# Patient Record
Sex: FEMALE | Race: BLACK OR AFRICAN AMERICAN | NOT HISPANIC OR LATINO | URBAN - METROPOLITAN AREA
[De-identification: names, ages, dates, MRNs, and addresses within clinical notes are randomized per-mention and may not be internally consistent; named-entity substitution may affect disease eponyms.]

---

## 2017-05-19 ENCOUNTER — OUTPATIENT (OUTPATIENT)
Dept: OUTPATIENT SERVICES | Facility: HOSPITAL | Age: 42
LOS: 1 days | Discharge: HOME | End: 2017-05-19

## 2017-06-28 DIAGNOSIS — Z02.1 ENCOUNTER FOR PRE-EMPLOYMENT EXAMINATION: ICD-10-CM

## 2017-11-13 ENCOUNTER — OUTPATIENT (OUTPATIENT)
Dept: OUTPATIENT SERVICES | Facility: HOSPITAL | Age: 42
LOS: 1 days | Discharge: HOME | End: 2017-11-13

## 2018-01-08 ENCOUNTER — INPATIENT (INPATIENT)
Facility: HOSPITAL | Age: 43
LOS: 0 days | Discharge: HOME | End: 2018-01-09
Attending: SURGERY | Admitting: SURGERY
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
## 2018-01-16 DIAGNOSIS — Z3A.24 24 WEEKS GESTATION OF PREGNANCY: ICD-10-CM

## 2018-01-16 DIAGNOSIS — Z04.1 ENCOUNTER FOR EXAMINATION AND OBSERVATION FOLLOWING TRANSPORT ACCIDENT: ICD-10-CM

## 2018-01-16 DIAGNOSIS — O9A.212 INJURY, POISONING AND CERTAIN OTHER CONSEQUENCES OF EXTERNAL CAUSES COMPLICATING PREGNANCY, SECOND TRIMESTER: ICD-10-CM

## 2018-01-16 DIAGNOSIS — M54.2 CERVICALGIA: ICD-10-CM
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
## 2018-01-16 DIAGNOSIS — R10.11 RIGHT UPPER QUADRANT PAIN: ICD-10-CM

## 2018-03-26 ENCOUNTER — OUTPATIENT (OUTPATIENT)
Dept: OUTPATIENT SERVICES | Facility: HOSPITAL | Age: 43
LOS: 1 days | Discharge: HOME | End: 2018-03-26

## 2018-03-26 DIAGNOSIS — N89.8 OTHER SPECIFIED NONINFLAMMATORY DISORDERS OF VAGINA: ICD-10-CM

## 2018-04-20 ENCOUNTER — INPATIENT (INPATIENT)
Facility: HOSPITAL | Age: 43
LOS: 1 days | Discharge: HOME | End: 2018-04-22
Attending: OBSTETRICS & GYNECOLOGY | Admitting: OBSTETRICS & GYNECOLOGY

## 2018-04-20 VITALS — SYSTOLIC BLOOD PRESSURE: 125 MMHG | DIASTOLIC BLOOD PRESSURE: 75 MMHG | HEART RATE: 104 BPM

## 2018-04-20 DIAGNOSIS — Z98.82 BREAST IMPLANT STATUS: Chronic | ICD-10-CM

## 2018-04-20 LAB
AMPHET UR-MCNC: NEGATIVE — SIGNIFICANT CHANGE UP
APPEARANCE UR: (no result)
BACTERIA # UR AUTO: (no result) /HPF
BARBITURATES UR SCN-MCNC: NEGATIVE — SIGNIFICANT CHANGE UP
BASOPHILS # BLD AUTO: 0.01 K/UL — SIGNIFICANT CHANGE UP (ref 0–0.2)
BASOPHILS NFR BLD AUTO: 0.1 % — SIGNIFICANT CHANGE UP (ref 0–1)
BENZODIAZ UR-MCNC: NEGATIVE — SIGNIFICANT CHANGE UP
BILIRUB UR-MCNC: NEGATIVE — SIGNIFICANT CHANGE UP
BLD GP AB SCN SERPL QL: SIGNIFICANT CHANGE UP
COCAINE METAB.OTHER UR-MCNC: NEGATIVE — SIGNIFICANT CHANGE UP
COLOR SPEC: YELLOW — SIGNIFICANT CHANGE UP
DIFF PNL FLD: NEGATIVE — SIGNIFICANT CHANGE UP
EOSINOPHIL # BLD AUTO: 0.04 K/UL — SIGNIFICANT CHANGE UP (ref 0–0.7)
EOSINOPHIL NFR BLD AUTO: 0.5 % — SIGNIFICANT CHANGE UP (ref 0–8)
EPI CELLS # UR: (no result) /HPF
GLUCOSE UR QL: NEGATIVE MG/DL — SIGNIFICANT CHANGE UP
HCT VFR BLD CALC: 32.7 % — LOW (ref 37–47)
HGB BLD-MCNC: 10.6 G/DL — LOW (ref 12–16)
IMM GRANULOCYTES NFR BLD AUTO: 0.4 % — HIGH (ref 0.1–0.3)
KETONES UR-MCNC: NEGATIVE — SIGNIFICANT CHANGE UP
LEUKOCYTE ESTERASE UR-ACNC: NEGATIVE — SIGNIFICANT CHANGE UP
LYMPHOCYTES # BLD AUTO: 1.27 K/UL — SIGNIFICANT CHANGE UP (ref 1.2–3.4)
LYMPHOCYTES # BLD AUTO: 15.4 % — LOW (ref 20.5–51.1)
MCHC RBC-ENTMCNC: 29.2 PG — SIGNIFICANT CHANGE UP (ref 27–31)
MCHC RBC-ENTMCNC: 32.4 G/DL — SIGNIFICANT CHANGE UP (ref 32–37)
MCV RBC AUTO: 90.1 FL — SIGNIFICANT CHANGE UP (ref 81–99)
METHADONE UR-MCNC: NEGATIVE — SIGNIFICANT CHANGE UP
MONOCYTES # BLD AUTO: 0.79 K/UL — HIGH (ref 0.1–0.6)
MONOCYTES NFR BLD AUTO: 9.6 % — HIGH (ref 1.7–9.3)
NEUTROPHILS # BLD AUTO: 6.12 K/UL — SIGNIFICANT CHANGE UP (ref 1.4–6.5)
NEUTROPHILS NFR BLD AUTO: 74 % — SIGNIFICANT CHANGE UP (ref 42.2–75.2)
NITRITE UR-MCNC: NEGATIVE — SIGNIFICANT CHANGE UP
NRBC # BLD: 0 /100 WBCS — SIGNIFICANT CHANGE UP (ref 0–0)
OPIATES UR-MCNC: NEGATIVE — SIGNIFICANT CHANGE UP
PCP SPEC-MCNC: SIGNIFICANT CHANGE UP
PH UR: 6.5 — SIGNIFICANT CHANGE UP (ref 5–8)
PLATELET # BLD AUTO: 175 K/UL — SIGNIFICANT CHANGE UP (ref 130–400)
PRENATAL SYPHILIS TEST: SIGNIFICANT CHANGE UP
PROPOXYPHENE QUALITATIVE URINE RESULT: NEGATIVE — SIGNIFICANT CHANGE UP
PROT UR-MCNC: (no result) MG/DL
RBC # BLD: 3.63 M/UL — LOW (ref 4.2–5.4)
RBC # FLD: 13.3 % — SIGNIFICANT CHANGE UP (ref 11.5–14.5)
SP GR SPEC: 1.02 — SIGNIFICANT CHANGE UP (ref 1.01–1.03)
TYPE + AB SCN PNL BLD: SIGNIFICANT CHANGE UP
UROBILINOGEN FLD QL: 0.2 MG/DL — SIGNIFICANT CHANGE UP (ref 0.2–0.2)
WBC # BLD: 8.26 K/UL — SIGNIFICANT CHANGE UP (ref 4.8–10.8)
WBC # FLD AUTO: 8.26 K/UL — SIGNIFICANT CHANGE UP (ref 4.8–10.8)

## 2018-04-20 RX ORDER — NALOXONE HYDROCHLORIDE 4 MG/.1ML
0.1 SPRAY NASAL
Qty: 0 | Refills: 0 | Status: DISCONTINUED | OUTPATIENT
Start: 2018-04-20 | End: 2018-04-22

## 2018-04-20 RX ORDER — SODIUM CHLORIDE 9 MG/ML
1000 INJECTION, SOLUTION INTRAVENOUS
Qty: 0 | Refills: 0 | Status: DISCONTINUED | OUTPATIENT
Start: 2018-04-20 | End: 2018-04-21

## 2018-04-20 RX ORDER — OXYTOCIN 10 UNIT/ML
2 VIAL (ML) INJECTION
Qty: 30 | Refills: 0 | Status: DISCONTINUED | OUTPATIENT
Start: 2018-04-20 | End: 2018-04-22

## 2018-04-20 RX ORDER — OXYTOCIN 10 UNIT/ML
41.67 VIAL (ML) INJECTION
Qty: 20 | Refills: 0 | Status: DISCONTINUED | OUTPATIENT
Start: 2018-04-20 | End: 2018-04-21

## 2018-04-20 RX ORDER — ONDANSETRON 8 MG/1
4 TABLET, FILM COATED ORAL EVERY 6 HOURS
Qty: 0 | Refills: 0 | Status: DISCONTINUED | OUTPATIENT
Start: 2018-04-20 | End: 2018-04-22

## 2018-04-20 RX ORDER — SODIUM CHLORIDE 9 MG/ML
1000 INJECTION, SOLUTION INTRAVENOUS ONCE
Qty: 0 | Refills: 0 | Status: DISCONTINUED | OUTPATIENT
Start: 2018-04-20 | End: 2018-04-21

## 2018-04-20 NOTE — PROCEDURE NOTE - GENERAL PROCEDURE DETAILS
[place] aseptic prep with betadine @l4L5 by loss of resistance technique under aseptic condition pt tolerated very well

## 2018-04-20 NOTE — OB PROVIDER H&P - NSHPPHYSICALEXAM_GEN_ALL_CORE
Vital Signs Last 24 Hrs  HR: 104 (20 Apr 2018 08:55) (104 - 104)  BP: 125/75 (20 Apr 2018 08:55) (125/75 - 125/75)  EFM: 130/mod/accels+  [place]: q6-7min   Abd: soft, gravid, nontender, palpable ctx   SVE: 3/50/-2, vtx by sono, intact

## 2018-04-20 NOTE — OB PROVIDER H&P - ASSESSMENT
41yo  at 39w GA, GBS neg, AMA, for IOL with pitocin for favorable cervix at term.   - admit to L&D  - admission labs   - continuous EFM/toco  - clear liquids   - pain mgmt prn     Dr. Powers and Dr. Longo

## 2018-04-20 NOTE — PROGRESS NOTE ADULT - SUBJECTIVE AND OBJECTIVE BOX
PGY III Note    pt seen and examined at bedside, requesting epidural for pain management, reports no additional issues at this time    T(F): --  HR: 84 (15:14)  BP: 115/70 (15:14)  RR: --    EFM: 125/mod/+acc  TOCO: q4   SVE: deferred    Labs:                        10.6   8.26  )-----------( 175      ( 2018 10:10 )             32.7       Urinalysis Basic - ( 2018 10:10 )    Color: Yellow / Appearance: Cloudy / S.020 / pH: x  Gluc: x / Ketone: Negative  / Bili: Negative / Urobili: 0.2 mg/dL   Blood: x / Protein: Trace mg/dL / Nitrite: Negative   Leuk Esterase: Negative / RBC: x / WBC x   Sq Epi: x / Non Sq Epi: Few /HPF / Bacteria: Moderate /HPF    Drug Screen W/PCP, Urine: Done (18 @ 10:10)    Prenatal Syphilis Test: Nonreact (18 @ 10:11)      Meds:  pitocin, now at 10 mu/min  epidural

## 2018-04-20 NOTE — PROGRESS NOTE ADULT - ASSESSMENT
43 yo  at 39w0d, GBS negative, IOL, now off pitocin, s/p SROM, in active labor  - pain management PRN  - continuous toco and efm  - PMD to reassess as needed, anticipate deliver

## 2018-04-20 NOTE — PROGRESS NOTE ADULT - SUBJECTIVE AND OBJECTIVE BOX
called to bedside b/p 80/50 , Pt complaining of nausea.  Pt currently receiving oxygen via mask, on left side.   Phenylephrine 100mcg administered IV   Repeat B/P 101/59

## 2018-04-20 NOTE — PROGRESS NOTE ADULT - SUBJECTIVE AND OBJECTIVE BOX
OBBLAYNEN PGY1 Note:     Patient seen and examined at bedside. Comfortable, denies complaints. Occasional late decels, put in left lateral position, oxygen by face mask and IV fluid bolus. S/P phenylephrine for symptomatic low BPs.      T(C): 36.7 (18 @ 15:16), Max: 37.1 (18 @ 09:08)  HR: 75 (18 @ 17:01) (74 - 107)  BP: 75/43 (18 @ 16:54) (75/43 - 125/75)  RR: 18 (18 @ 09:08) (18 - 18)  EFM: 130/mod/accels+/late decels  [place]: q1-2min  SVE: deferred    Meds: lactated ringers Bolus 1000 milliLiter(s) IV Bolus once  lactated ringers. 1000 milliLiter(s) IV Continuous <Continuous>  naloxone Injectable 0.1 milliGRAM(s) IV Push every 3 minutes PRN  ondansetron Injectable 4 milliGRAM(s) IV Push every 6 hours PRN  oxytocin Infusion 41.667 milliUNIT(s)/Min IV Continuous <Continuous>  oxytocin Infusion 2 milliUNIT(s)/Min IV Continuous <Continuous> started at 0953, currently at 10mu/min   1515- epidural      Labs:             none new         A/P: 41yo  at 39w GA, GBS neg, IOL now on pitocin, S/P epidural   - continuous EFM/toco  - IV fluids   - pain mgmt prn   - c/w pitocin   - monitor vital signs Statement Selected

## 2018-04-20 NOTE — OB PROVIDER H&P - ATTENDING COMMENTS
IMP: IUP @ 39 wks, Favorable Cervix    Plan: Admit, Pitocin Induction, Pain Management, Anticipated

## 2018-04-20 NOTE — OB PROVIDER H&P - NSHPLABSRESULTS_GEN_ALL_CORE
GTT 87/141/105/100   ega- 29.3w- efw 1388g (51%) cephalic, 3vc, anterior placenta, MVP 6.1cm normal anatomy  ega- 18.4w- efw 309g, transverse, 3vc, anterior placenta, normal anatomy  ega- 12.5w- SIUP

## 2018-04-20 NOTE — OB PROVIDER DELIVERY SUMMARY - NSPROVIDERDELIVERYNOTE_OBGYN_ALL_OB_FT
Pt became fully dilated and pushed well over intact perineum, delivery of head in MYRA position, nose and mouth bulb suctioned on perineum, followed by delivery of shoulders and body without difficulty, live male infant, APGARs 9/9, 3545gms, 3-vessel cord + placenta complete, no complications

## 2018-04-20 NOTE — PROGRESS NOTE ADULT - SUBJECTIVE AND OBJECTIVE BOX
NICHOLAS PGY1 Note:     Patient seen and examined at bedside. Comfortable, denies complaints.      T(C): 37.1 (18 @ 09:08), Max: 37.1 (18 @ 09:08)  HR: 104 (18 @ 09:08) (104 - 104)  BP: 125/75 (18 @ 09:08) (125/75 - 125/75)  RR: 18 (18 @ 09:08) (18 - 18)  EFM: 135/mod/accels+  Alexander: q2-3min  SVE: deferred    Meds: lactated ringers Bolus 1000 milliLiter(s) IV Bolus once  lactated ringers. 1000 milliLiter(s) IV Continuous <Continuous>  oxytocin Infusion 41.667 milliUNIT(s)/Min IV Continuous <Continuous>  oxytocin Infusion 2 milliUNIT(s)/Min IV Continuous <Continuous> started at 1008m currently at 6mu/min      Labs:   A pos, RPR- NR                     10.6   8.26  )-----------( 175      ( 2018 10:10 )             32.7         Urinalysis Basic - ( 2018 10:10 )    Color: Yellow / Appearance: Cloudy / S.020 / pH: x  Gluc: x / Ketone: Negative  / Bili: Negative / Urobili: 0.2 mg/dL   Blood: x / Protein: Trace mg/dL / Nitrite: Negative   Leuk Esterase: Negative / RBC: x / WBC x   Sq Epi: x / Non Sq Epi: Few /HPF / Bacteria: Moderate /HPF          A/P: 41yo  at 39w GA, GBS neg, for IOL with pitocin   - continuous EFm/toco  - clear liquids   - pain mgmt prn   - c/w pitocin

## 2018-04-20 NOTE — OB PROVIDER DELIVERY SUMMARY - NSNYCREQUIREMENTS_OBGYN_ALL_OB
ADD [facility] REQUIREMENTS SECTION ([facility]/J/SI)...

## 2018-04-20 NOTE — PROGRESS NOTE ADULT - SUBJECTIVE AND OBJECTIVE BOX
PGY III Note    pt seen and examined at bedside by PMD, desired pain management at this time. No Additional complaints    T(F): --  HR: 65 (23:42)  BP: 91/52 (23:42)  RR: --    EFM: 115/mod/+acc  TOCO: q4-5 minutes  SVE: 9/100/0    Labs:                        10.6   8.26  )-----------( 175      ( 2018 10:10 )             32.7       Urinalysis Basic - ( 2018 10:10 )    Color: Yellow / Appearance: Cloudy / S.020 / pH: x  Gluc: x / Ketone: Negative  / Bili: Negative / Urobili: 0.2 mg/dL   Blood: x / Protein: Trace mg/dL / Nitrite: Negative   Leuk Esterase: Negative / RBC: x / WBC x   Sq Epi: x / Non Sq Epi: Few /HPF / Bacteria: Moderate /HPF      Drug Screen W/PCP, Urine: Done (18 @ 10:10)    Prenatal Syphilis Test: Nonreact (18 @ 10:11)      Meds:  pitocin, now d/c'd  epidural

## 2018-04-20 NOTE — PROGRESS NOTE ADULT - ASSESSMENT
43 yo  at 39w0d, GBS negative, IOL, now on pitocin, s/p epidural  - continue with pitocin induction  - pain management PRN  - continuous toco and efm    Dr. Longo aware

## 2018-04-20 NOTE — OB PROVIDER H&P - HISTORY OF PRESENT ILLNESS
41yo  at 39w GA, by LMP c/w 1st trim sono, for IOL in view of favorable cervix at term. Denies ctx, VB/LOF. Good fetal movements. Elevated GCT, normal GTT. No other complications during this pregnancy. Last exam 3/50/-2.

## 2018-04-21 LAB
BASOPHILS # BLD AUTO: 0.03 K/UL — SIGNIFICANT CHANGE UP (ref 0–0.2)
BASOPHILS NFR BLD AUTO: 0.3 % — SIGNIFICANT CHANGE UP (ref 0–1)
EOSINOPHIL # BLD AUTO: 0.05 K/UL — SIGNIFICANT CHANGE UP (ref 0–0.7)
EOSINOPHIL NFR BLD AUTO: 0.5 % — SIGNIFICANT CHANGE UP (ref 0–8)
HCT VFR BLD CALC: 30.3 % — LOW (ref 37–47)
HGB BLD-MCNC: 10 G/DL — LOW (ref 12–16)
IMM GRANULOCYTES NFR BLD AUTO: 0.5 % — HIGH (ref 0.1–0.3)
LYMPHOCYTES # BLD AUTO: 1.7 K/UL — SIGNIFICANT CHANGE UP (ref 1.2–3.4)
LYMPHOCYTES # BLD AUTO: 16.2 % — LOW (ref 20.5–51.1)
MCHC RBC-ENTMCNC: 29.7 PG — SIGNIFICANT CHANGE UP (ref 27–31)
MCHC RBC-ENTMCNC: 33 G/DL — SIGNIFICANT CHANGE UP (ref 32–37)
MCV RBC AUTO: 89.9 FL — SIGNIFICANT CHANGE UP (ref 81–99)
MONOCYTES # BLD AUTO: 0.88 K/UL — HIGH (ref 0.1–0.6)
MONOCYTES NFR BLD AUTO: 8.4 % — SIGNIFICANT CHANGE UP (ref 1.7–9.3)
NEUTROPHILS # BLD AUTO: 7.78 K/UL — HIGH (ref 1.4–6.5)
NEUTROPHILS NFR BLD AUTO: 74.1 % — SIGNIFICANT CHANGE UP (ref 42.2–75.2)
NRBC # BLD: 0 /100 WBCS — SIGNIFICANT CHANGE UP (ref 0–0)
PLATELET # BLD AUTO: 156 K/UL — SIGNIFICANT CHANGE UP (ref 130–400)
RBC # BLD: 3.37 M/UL — LOW (ref 4.2–5.4)
RBC # FLD: 13.3 % — SIGNIFICANT CHANGE UP (ref 11.5–14.5)
WBC # BLD: 10.49 K/UL — SIGNIFICANT CHANGE UP (ref 4.8–10.8)
WBC # FLD AUTO: 10.49 K/UL — SIGNIFICANT CHANGE UP (ref 4.8–10.8)

## 2018-04-21 RX ORDER — OXYTOCIN 10 UNIT/ML
41.67 VIAL (ML) INJECTION
Qty: 20 | Refills: 0 | Status: DISCONTINUED | OUTPATIENT
Start: 2018-04-21 | End: 2018-04-22

## 2018-04-21 RX ORDER — SIMETHICONE 80 MG/1
80 TABLET, CHEWABLE ORAL EVERY 6 HOURS
Qty: 0 | Refills: 0 | Status: DISCONTINUED | OUTPATIENT
Start: 2018-04-21 | End: 2018-04-22

## 2018-04-21 RX ORDER — MAGNESIUM HYDROXIDE 400 MG/1
30 TABLET, CHEWABLE ORAL
Qty: 0 | Refills: 0 | Status: DISCONTINUED | OUTPATIENT
Start: 2018-04-21 | End: 2018-04-22

## 2018-04-21 RX ORDER — SODIUM CHLORIDE 9 MG/ML
3 INJECTION INTRAMUSCULAR; INTRAVENOUS; SUBCUTANEOUS EVERY 8 HOURS
Qty: 0 | Refills: 0 | Status: DISCONTINUED | OUTPATIENT
Start: 2018-04-21 | End: 2018-04-22

## 2018-04-21 RX ORDER — BENZOCAINE 10 %
1 GEL (GRAM) MUCOUS MEMBRANE EVERY 6 HOURS
Qty: 0 | Refills: 0 | Status: DISCONTINUED | OUTPATIENT
Start: 2018-04-21 | End: 2018-04-22

## 2018-04-21 RX ORDER — AER TRAVELER 0.5 G/1
1 SOLUTION RECTAL; TOPICAL EVERY 4 HOURS
Qty: 0 | Refills: 0 | Status: DISCONTINUED | OUTPATIENT
Start: 2018-04-21 | End: 2018-04-22

## 2018-04-21 RX ORDER — ACETAMINOPHEN 500 MG
650 TABLET ORAL EVERY 6 HOURS
Qty: 0 | Refills: 0 | Status: DISCONTINUED | OUTPATIENT
Start: 2018-04-21 | End: 2018-04-22

## 2018-04-21 RX ORDER — OXYCODONE AND ACETAMINOPHEN 5; 325 MG/1; MG/1
2 TABLET ORAL EVERY 6 HOURS
Qty: 0 | Refills: 0 | Status: DISCONTINUED | OUTPATIENT
Start: 2018-04-21 | End: 2018-04-22

## 2018-04-21 RX ORDER — LANOLIN
1 OINTMENT (GRAM) TOPICAL EVERY 6 HOURS
Qty: 0 | Refills: 0 | Status: DISCONTINUED | OUTPATIENT
Start: 2018-04-21 | End: 2018-04-22

## 2018-04-21 RX ORDER — DOCUSATE SODIUM 100 MG
100 CAPSULE ORAL
Qty: 0 | Refills: 0 | Status: DISCONTINUED | OUTPATIENT
Start: 2018-04-21 | End: 2018-04-22

## 2018-04-21 RX ORDER — DIBUCAINE 1 %
1 OINTMENT (GRAM) RECTAL EVERY 4 HOURS
Qty: 0 | Refills: 0 | Status: DISCONTINUED | OUTPATIENT
Start: 2018-04-21 | End: 2018-04-22

## 2018-04-21 RX ORDER — IBUPROFEN 200 MG
600 TABLET ORAL EVERY 6 HOURS
Qty: 0 | Refills: 0 | Status: DISCONTINUED | OUTPATIENT
Start: 2018-04-21 | End: 2018-04-22

## 2018-04-21 RX ORDER — DIPHENHYDRAMINE HCL 50 MG
25 CAPSULE ORAL EVERY 6 HOURS
Qty: 0 | Refills: 0 | Status: DISCONTINUED | OUTPATIENT
Start: 2018-04-21 | End: 2018-04-22

## 2018-04-21 RX ADMIN — Medication 600 MILLIGRAM(S): at 14:14

## 2018-04-21 RX ADMIN — Medication 600 MILLIGRAM(S): at 13:44

## 2018-04-21 RX ADMIN — SODIUM CHLORIDE 3 MILLILITER(S): 9 INJECTION INTRAMUSCULAR; INTRAVENOUS; SUBCUTANEOUS at 22:11

## 2018-04-21 RX ADMIN — Medication 600 MILLIGRAM(S): at 03:46

## 2018-04-21 RX ADMIN — SODIUM CHLORIDE 3 MILLILITER(S): 9 INJECTION INTRAMUSCULAR; INTRAVENOUS; SUBCUTANEOUS at 06:36

## 2018-04-21 RX ADMIN — SODIUM CHLORIDE 3 MILLILITER(S): 9 INJECTION INTRAMUSCULAR; INTRAVENOUS; SUBCUTANEOUS at 10:31

## 2018-04-21 NOTE — PROGRESS NOTE ADULT - SUBJECTIVE AND OBJECTIVE BOX
Pt seen at bedside, no complaints, nursing    Vital Signs Last 24 Hrs  T(C): 36.2 (21 Apr 2018 07:43), Max: 36.7 (20 Apr 2018 15:16)  T(F): 97.1 (21 Apr 2018 07:43), Max: 98.06 (20 Apr 2018 15:16)  HR: 67 (21 Apr 2018 07:43) (54 - 107)  BP: 106/58 (21 Apr 2018 07:43) (70/36 - 128/71)  RR: 19 (21 Apr 2018 07:43) (18 - 19)    Resp - CTA b/l  CVS - S1S2+, RRR  Breasts - soft, NT  Abd - soft, NTND, BS+, uterus - firm  VE - Moderate lochia, perineum- intact  Ext - No Homans    LABS                          10.6   8.26  )-----------( 175      ( 20 Apr 2018 10:10 )             32.7       A Pos, Rubella - Immune, RPR - NR

## 2018-04-22 ENCOUNTER — TRANSCRIPTION ENCOUNTER (OUTPATIENT)
Age: 43
End: 2018-04-22

## 2018-04-22 VITALS
RESPIRATION RATE: 18 BRPM | DIASTOLIC BLOOD PRESSURE: 55 MMHG | SYSTOLIC BLOOD PRESSURE: 107 MMHG | TEMPERATURE: 96 F | HEART RATE: 81 BPM

## 2018-04-22 RX ORDER — IBUPROFEN 200 MG
1 TABLET ORAL
Qty: 0 | Refills: 0 | DISCHARGE
Start: 2018-04-22

## 2018-04-22 RX ADMIN — Medication 600 MILLIGRAM(S): at 08:18

## 2018-04-22 RX ADMIN — Medication 600 MILLIGRAM(S): at 00:51

## 2018-04-22 RX ADMIN — Medication 600 MILLIGRAM(S): at 01:22

## 2018-04-22 NOTE — PROGRESS NOTE ADULT - SUBJECTIVE AND OBJECTIVE BOX
Pt seen at bedside, no complaints, nursing    Vital Signs Last 24 Hrs  T(C): 35.7 (22 Apr 2018 08:34), Max: 36.3 (21 Apr 2018 23:57)  T(F): 96.3 (22 Apr 2018 08:34), Max: 97.3 (21 Apr 2018 23:57)  HR: 81 (22 Apr 2018 08:34) (81 - 92)  BP: 107/55 (22 Apr 2018 08:34) (98/56 - 107/55)  RR: 18 (22 Apr 2018 08:34) (18 - 20)    Resp - CTA b/l  CVS - S1S2+, RRR  Breasts - soft, NT  Abd - soft, NTND, BS+, uterus - firm  VE - Minimal lochia, perineum- intact  Ext - No Homans    LABS                          10.0   10.49 )-----------( 156      ( 21 Apr 2018 18:35 )             30.3

## 2018-04-22 NOTE — DISCHARGE NOTE OB - CARE PROVIDER_API CALL
Georges Longo), Obstetrics and Gynecology  [address] [place] [address]  Phone: [phone]  Fax: [phone]

## 2018-04-22 NOTE — DISCHARGE NOTE OB - MEDICATION SUMMARY - MEDICATIONS TO TAKE
I will START or STAY ON the medications listed below when I get home from the hospital:    ibuprofen 600 mg oral tablet  -- 1 tab(s) by mouth every 6 hours, As needed, Moderate Pain  -- Indication: For home

## 2018-04-22 NOTE — DISCHARGE NOTE OB - PATIENT PORTAL LINK FT
You can access the [facility] Patient Portal, offered by [facility], by registering with the following website: [email or web address]

## 2018-04-26 DIAGNOSIS — Z3A.39 39 WEEKS GESTATION OF PREGNANCY: ICD-10-CM

## 2018-04-26 DIAGNOSIS — Z33.1 PREGNANT STATE, INCIDENTAL: ICD-10-CM

## 2019-10-03 ENCOUNTER — APPOINTMENT (OUTPATIENT)
Dept: OBGYN | Facility: CLINIC | Age: 44
End: 2019-10-03

## 2019-10-03 PROBLEM — Z00.00 ENCOUNTER FOR PREVENTIVE HEALTH EXAMINATION: Status: ACTIVE | Noted: 2019-10-03

## 2019-10-03 LAB — CYTOLOGY CVX/VAG DOC THIN PREP: NORMAL

## 2020-01-21 ENCOUNTER — APPOINTMENT (OUTPATIENT)
Dept: OBGYN | Facility: CLINIC | Age: 45
End: 2020-01-21
Payer: COMMERCIAL

## 2020-01-21 VITALS
HEIGHT: 69 IN | SYSTOLIC BLOOD PRESSURE: 124 MMHG | DIASTOLIC BLOOD PRESSURE: 78 MMHG | BODY MASS INDEX: 26.96 KG/M2 | WEIGHT: 182 LBS

## 2020-01-21 DIAGNOSIS — R30.0 DYSURIA: ICD-10-CM

## 2020-01-21 DIAGNOSIS — Z87.440 PERSONAL HISTORY OF URINARY (TRACT) INFECTIONS: ICD-10-CM

## 2020-01-21 PROCEDURE — 81002 URINALYSIS NONAUTO W/O SCOPE: CPT

## 2020-01-21 PROCEDURE — 99214 OFFICE O/P EST MOD 30 MIN: CPT | Mod: 25

## 2020-01-21 RX ORDER — CIPROFLOXACIN HYDROCHLORIDE 500 MG/1
500 TABLET, FILM COATED ORAL TWICE DAILY
Qty: 14 | Refills: 0 | Status: COMPLETED | COMMUNITY
Start: 2020-01-21 | End: 2020-01-28

## 2020-01-21 RX ORDER — PHENAZOPYRIDINE HYDROCHLORIDE 200 MG/1
200 TABLET ORAL 3 TIMES DAILY
Qty: 6 | Refills: 0 | Status: COMPLETED | COMMUNITY
Start: 2020-01-21 | End: 2020-01-23

## 2020-03-26 RX ORDER — CIPROFLOXACIN HYDROCHLORIDE 500 MG/1
500 TABLET, FILM COATED ORAL
Qty: 14 | Refills: 0 | Status: ACTIVE | COMMUNITY
Start: 2020-03-26 | End: 1900-01-01

## 2020-06-01 DIAGNOSIS — Z30.9 ENCOUNTER FOR CONTRACEPTIVE MANAGEMENT, UNSPECIFIED: ICD-10-CM

## 2020-06-01 RX ORDER — NORETHINDRONE ACETATE AND ETHINYL ESTRADIOL, ETHINYL ESTRADIOL AND FERROUS FUMARATE 1MG-10(24)
1 MG-10 MCG / KIT ORAL DAILY
Qty: 3 | Refills: 0 | Status: ACTIVE | COMMUNITY
Start: 2020-06-01 | End: 1900-01-01

## 2020-07-31 ENCOUNTER — APPOINTMENT (OUTPATIENT)
Dept: OBGYN | Facility: CLINIC | Age: 45
End: 2020-07-31
Payer: COMMERCIAL

## 2020-07-31 VITALS
BODY MASS INDEX: 27.17 KG/M2 | SYSTOLIC BLOOD PRESSURE: 112 MMHG | TEMPERATURE: 97.1 F | DIASTOLIC BLOOD PRESSURE: 80 MMHG | WEIGHT: 184 LBS

## 2020-07-31 PROCEDURE — 99396 PREV VISIT EST AGE 40-64: CPT

## 2020-07-31 RX ORDER — NORETHINDRONE ACETATE AND ETHINYL ESTRADIOL, ETHINYL ESTRADIOL AND FERROUS FUMARATE 1MG-10(24)
1 MG-10 MCG / KIT ORAL DAILY
Qty: 3 | Refills: 1 | Status: COMPLETED | COMMUNITY
Start: 2020-07-31 | End: 2021-01-27

## 2020-07-31 NOTE — CHIEF COMPLAINT
[Follow Up] : follow up GYN visit [FreeTextEntry1] : patient is here for a follow up appointment and birth control refills.

## 2020-07-31 NOTE — PHYSICAL EXAM
[Awake] : awake [Alert] : alert [Examination Of The Breasts] : a normal appearance [Breast Implant Bilateral] : implants [No Discharge] : no discharge [No Masses] : no breast masses were palpable [Soft] : soft [Oriented x3] : oriented to person, place, and time [Normal] : uterus [No Bleeding] : there was no active vaginal bleeding [Uterine Adnexae] : were not tender and not enlarged [Acute Distress] : no acute distress [Mass] : no breast mass [Nipple Discharge] : no nipple discharge [Axillary LAD] : no axillary lymphadenopathy [Tender] : non tender

## 2020-08-18 DIAGNOSIS — Z86.19 PERSONAL HISTORY OF OTHER INFECTIOUS AND PARASITIC DISEASES: ICD-10-CM

## 2020-08-18 RX ORDER — CLOTRIMAZOLE AND BETAMETHASONE DIPROPIONATE 10; .5 MG/G; MG/G
1-0.05 CREAM TOPICAL TWICE DAILY
Qty: 1 | Refills: 0 | Status: ACTIVE | COMMUNITY
Start: 2020-08-18 | End: 1900-01-01

## 2020-08-18 RX ORDER — FLUCONAZOLE 150 MG/1
150 TABLET ORAL
Qty: 1 | Refills: 0 | Status: ACTIVE | COMMUNITY
Start: 2020-08-18 | End: 1900-01-01

## 2020-09-21 RX ORDER — FLUCONAZOLE 150 MG/1
150 TABLET ORAL
Qty: 1 | Refills: 0 | Status: ACTIVE | COMMUNITY
Start: 2020-09-21 | End: 1900-01-01

## 2020-12-23 PROBLEM — Z30.9 ENCOUNTER FOR BIRTH CONTROL: Status: RESOLVED | Noted: 2020-06-01 | Resolved: 2020-12-23

## 2020-12-23 PROBLEM — Z86.19 HISTORY OF CANDIDAL VULVOVAGINITIS: Status: RESOLVED | Noted: 2020-08-18 | Resolved: 2020-12-23

## 2020-12-23 PROBLEM — Z87.440 HISTORY OF URINARY TRACT INFECTION: Status: RESOLVED | Noted: 2020-01-21 | Resolved: 2020-12-23

## 2020-12-31 ENCOUNTER — APPOINTMENT (OUTPATIENT)
Dept: OBGYN | Facility: CLINIC | Age: 45
End: 2020-12-31
Payer: COMMERCIAL

## 2020-12-31 DIAGNOSIS — Z87.898 PERSONAL HISTORY OF OTHER SPECIFIED CONDITIONS: ICD-10-CM

## 2020-12-31 DIAGNOSIS — N39.0 URINARY TRACT INFECTION, SITE NOT SPECIFIED: ICD-10-CM

## 2020-12-31 DIAGNOSIS — R31.29 OTHER MICROSCOPIC HEMATURIA: ICD-10-CM

## 2020-12-31 DIAGNOSIS — R82.998 OTHER ABNORMAL FINDINGS IN URINE: ICD-10-CM

## 2020-12-31 DIAGNOSIS — N89.8 OTHER SPECIFIED NONINFLAMMATORY DISORDERS OF VAGINA: ICD-10-CM

## 2020-12-31 DIAGNOSIS — B96.89 ACUTE VAGINITIS: ICD-10-CM

## 2020-12-31 DIAGNOSIS — R30.0 DYSURIA: ICD-10-CM

## 2020-12-31 DIAGNOSIS — N76.0 ACUTE VAGINITIS: ICD-10-CM

## 2020-12-31 PROCEDURE — 81002 URINALYSIS NONAUTO W/O SCOPE: CPT

## 2020-12-31 PROCEDURE — 99214 OFFICE O/P EST MOD 30 MIN: CPT

## 2020-12-31 PROCEDURE — 99072 ADDL SUPL MATRL&STAF TM PHE: CPT

## 2020-12-31 RX ORDER — NORETHINDRONE ACETATE AND ETHINYL ESTRADIOL, ETHINYL ESTRADIOL AND FERROUS FUMARATE 1MG-10(24)
1 MG-10 MCG / KIT ORAL DAILY
Qty: 3 | Refills: 1 | Status: COMPLETED | COMMUNITY
Start: 2020-12-31 | End: 2021-06-29

## 2020-12-31 RX ORDER — NITROFURANTOIN (MONOHYDRATE/MACROCRYSTALS) 25; 75 MG/1; MG/1
100 CAPSULE ORAL
Qty: 14 | Refills: 0 | Status: COMPLETED | COMMUNITY
Start: 2020-12-31 | End: 2021-01-07

## 2020-12-31 RX ORDER — CLINDAMYCIN PHOSPHATE 20 MG/G
2 CREAM VAGINAL
Qty: 1 | Refills: 0 | Status: ACTIVE | COMMUNITY
Start: 2020-12-31 | End: 1900-01-01

## 2020-12-31 NOTE — REASON FOR VISIT
[Follow-Up] : a follow-up evaluation of [Urinary Complaints] : urinary complaints [Vulvar/Vaginal Complaint] : vulvar/vaginal complaint [FreeTextEntry2] : complaining of odor and discharge

## 2020-12-31 NOTE — DISCUSSION/SUMMARY
[FreeTextEntry1] : BVV test done\par Prescribed Macrobid and clindamycin cream\par Continue oral contraceptives with instructions/precautions\par Follow-up 6 months or as needed\par

## 2020-12-31 NOTE — HISTORY OF PRESENT ILLNESS
[FreeTextEntry1] : Patient is 45 years old para 3-0-0-3 last menstrual period December 15, 2020\par She is presently complaining of vaginal discharge with odor\par She also complains of urinary symptoms including frequency and pain on urination\par Urine dip notes moderate leukocytes and microscopic hematuria

## 2020-12-31 NOTE — PHYSICAL EXAM
[Appropriately responsive] : appropriately responsive [Alert] : alert [No Acute Distress] : no acute distress [No Lymphadenopathy] : no lymphadenopathy [Regular Rate Rhythm] : regular rate rhythm [No Murmurs] : no murmurs [Clear to Auscultation B/L] : clear to auscultation bilaterally [Soft] : soft [Non-tender] : non-tender [Non-distended] : non-distended [No HSM] : No HSM [No Lesions] : no lesions [No Mass] : no mass [Oriented x3] : oriented x3 [Labia Majora] : normal [Labia Minora] : normal [Tenderness] : tenderness [Discharge] : a  ~M vaginal discharge was present [Normal] : normal [Uterine Adnexae] : normal

## 2021-01-13 DIAGNOSIS — B37.3 CANDIDIASIS OF VULVA AND VAGINA: ICD-10-CM

## 2021-01-13 RX ORDER — FLUCONAZOLE 150 MG/1
150 TABLET ORAL
Qty: 1 | Refills: 0 | Status: ACTIVE | COMMUNITY
Start: 2021-01-13 | End: 1900-01-01

## 2021-06-24 ENCOUNTER — APPOINTMENT (OUTPATIENT)
Dept: OBGYN | Facility: CLINIC | Age: 46
End: 2021-06-24
Payer: COMMERCIAL

## 2021-06-24 VITALS
DIASTOLIC BLOOD PRESSURE: 70 MMHG | BODY MASS INDEX: 29.68 KG/M2 | TEMPERATURE: 97.9 F | WEIGHT: 201 LBS | SYSTOLIC BLOOD PRESSURE: 114 MMHG

## 2021-06-24 DIAGNOSIS — R10.2 PELVIC AND PERINEAL PAIN: ICD-10-CM

## 2021-06-24 DIAGNOSIS — N91.2 AMENORRHEA, UNSPECIFIED: ICD-10-CM

## 2021-06-24 DIAGNOSIS — R31.29 OTHER MICROSCOPIC HEMATURIA: ICD-10-CM

## 2021-06-24 DIAGNOSIS — N89.8 OTHER SPECIFIED NONINFLAMMATORY DISORDERS OF VAGINA: ICD-10-CM

## 2021-06-24 DIAGNOSIS — Z87.898 PERSONAL HISTORY OF OTHER SPECIFIED CONDITIONS: ICD-10-CM

## 2021-06-24 DIAGNOSIS — Z87.42 PERSONAL HISTORY OF OTHER DISEASES OF THE FEMALE GENITAL TRACT: ICD-10-CM

## 2021-06-24 PROCEDURE — 81002 URINALYSIS NONAUTO W/O SCOPE: CPT

## 2021-06-24 PROCEDURE — 76830 TRANSVAGINAL US NON-OB: CPT

## 2021-06-24 PROCEDURE — 99214 OFFICE O/P EST MOD 30 MIN: CPT | Mod: 25

## 2021-06-24 PROCEDURE — 99072 ADDL SUPL MATRL&STAF TM PHE: CPT

## 2021-06-24 RX ORDER — NORETHINDRONE ACETATE AND ETHINYL ESTRADIOL, ETHINYL ESTRADIOL AND FERROUS FUMARATE 1MG-10(24)
1 MG-10 MCG / KIT ORAL DAILY
Qty: 1 | Refills: 1 | Status: ACTIVE | COMMUNITY
Start: 2021-06-24 | End: 1900-01-01

## 2021-06-24 NOTE — PROCEDURE
[Pelvic Pain] : pelvic pain [Amenorrhea] : Amenorrhea [Transvaginal Ultrasound] : transvaginal ultrasound [Anteverted] : anteverted [No Fibroid(s)] : no fibroid(s) [L: ___ cm] : L: [unfilled] cm [H: ___ cm] : H: [unfilled] cm [FreeTextEntry7] : 1.9 x 3.2 cm [FreeTextEntry8] : 2.1 x 3.2 cm

## 2021-06-24 NOTE — DISCUSSION/SUMMARY
[FreeTextEntry1] : Continue oral contraceptives in the form of Lo Loestrin Fe with instructions/precautions\par Follow-up for Pap and full examination as scheduled\par Follow-up with urologist regarding urinary complaints/microscopic hematuria.\par B VV test done\par

## 2021-06-24 NOTE — HISTORY OF PRESENT ILLNESS
[FreeTextEntry1] : Patient is 45 years old para 3-0-0-3 last menstrual period November 2019\par Patient experiences amenorrhea secondary to low-dose oral contraceptives in the form of Lo Loestrin Fe.  Patient is requesting a refill on oral contraceptives at this time she is due to return for Pap and full exam in August 2021.\par Presently the patient complains of pelvic pressure, frequency of urination.  Urine dip notes negative for leukocytes and positive for microscopic hematuria.  Patient also notes vaginal discharge.

## 2021-06-24 NOTE — REASON FOR VISIT
[Follow-Up] : a follow-up evaluation of [Urinary Complaints] : urinary complaints [Pelvic Pain] : pelvic pain [Vulvar/Vaginal Complaint] : vulvar/vaginal complaint

## 2021-06-28 RX ORDER — METRONIDAZOLE 500 MG/1
500 TABLET ORAL TWICE DAILY
Qty: 14 | Refills: 0 | Status: ACTIVE | COMMUNITY
Start: 2021-06-28 | End: 1900-01-01

## 2021-08-09 ENCOUNTER — APPOINTMENT (OUTPATIENT)
Dept: OBGYN | Facility: CLINIC | Age: 46
End: 2021-08-09
Payer: COMMERCIAL

## 2021-08-09 VITALS
BODY MASS INDEX: 29.77 KG/M2 | SYSTOLIC BLOOD PRESSURE: 110 MMHG | WEIGHT: 201 LBS | HEIGHT: 69 IN | DIASTOLIC BLOOD PRESSURE: 68 MMHG

## 2021-08-09 DIAGNOSIS — Z01.419 ENCOUNTER FOR GYNECOLOGICAL EXAMINATION (GENERAL) (ROUTINE) W/OUT ABNORMAL FINDINGS: ICD-10-CM

## 2021-08-09 PROCEDURE — 99396 PREV VISIT EST AGE 40-64: CPT

## 2021-08-09 RX ORDER — NORETHINDRONE ACETATE AND ETHINYL ESTRADIOL, ETHINYL ESTRADIOL AND FERROUS FUMARATE 1MG-10(24)
1 MG-10 MCG / KIT ORAL DAILY
Qty: 3 | Refills: 1 | Status: ACTIVE | COMMUNITY
Start: 2021-08-09 | End: 1900-01-01

## 2021-08-09 NOTE — DISCUSSION/SUMMARY
[FreeTextEntry1] : Pap done\par Self breast exam stressed\par Continue oral contraceptives in the form of Lo Loestrin with instructions/precautions\par Follow-up 6 months or as needed

## 2021-08-09 NOTE — HISTORY OF PRESENT ILLNESS
[FreeTextEntry1] : Patient is 45 years old para 3-0-0-3 last menstrual period November 2019\par She is presently on oral contraceptives in the form of Lo Loestrin Fe and notes light to no menstrual bleeding.  She denies breakthrough bleeding.

## 2021-09-23 ENCOUNTER — EMERGENCY (EMERGENCY)
Facility: HOSPITAL | Age: 46
LOS: 0 days | Discharge: HOME | End: 2021-09-23
Attending: EMERGENCY MEDICINE | Admitting: EMERGENCY MEDICINE
Payer: COMMERCIAL

## 2021-09-23 VITALS
HEART RATE: 87 BPM | TEMPERATURE: 97 F | RESPIRATION RATE: 18 BRPM | HEIGHT: 69 IN | OXYGEN SATURATION: 99 % | DIASTOLIC BLOOD PRESSURE: 74 MMHG | WEIGHT: 210.1 LBS | SYSTOLIC BLOOD PRESSURE: 139 MMHG
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
## 2021-09-23 DIAGNOSIS — M54.2 CERVICALGIA: ICD-10-CM

## 2021-09-23 DIAGNOSIS — R11.0 NAUSEA: ICD-10-CM
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
## 2021-09-23 DIAGNOSIS — R51.9 HEADACHE, UNSPECIFIED: ICD-10-CM

## 2021-09-23 DIAGNOSIS — M62.838 OTHER MUSCLE SPASM: ICD-10-CM

## 2021-09-23 DIAGNOSIS — Z98.82 BREAST IMPLANT STATUS: Chronic | ICD-10-CM

## 2021-09-23 PROCEDURE — 72125 CT NECK SPINE W/O DYE: CPT | Mod: 26,MA

## 2021-09-23 PROCEDURE — 70450 CT HEAD/BRAIN W/O DYE: CPT | Mod: 26,MA

## 2021-09-23 PROCEDURE — 99285 EMERGENCY DEPT VISIT HI MDM: CPT

## 2021-09-23 RX ORDER — CYCLOBENZAPRINE HYDROCHLORIDE 10 MG/1
10 TABLET, FILM COATED ORAL ONCE
Refills: 0 | Status: COMPLETED | OUTPATIENT
Start: 2021-09-23 | End: 2021-09-23

## 2021-09-23 RX ORDER — IBUPROFEN 200 MG
600 TABLET ORAL ONCE
Refills: 0 | Status: COMPLETED | OUTPATIENT
Start: 2021-09-23 | End: 2021-09-23

## 2021-09-23 RX ORDER — ACETAMINOPHEN 500 MG
650 TABLET ORAL ONCE
Refills: 0 | Status: COMPLETED | OUTPATIENT
Start: 2021-09-23 | End: 2021-09-23

## 2021-09-23 RX ADMIN — Medication 600 MILLIGRAM(S): at 10:20

## 2021-09-23 RX ADMIN — Medication 650 MILLIGRAM(S): at 10:20

## 2021-09-23 RX ADMIN — CYCLOBENZAPRINE HYDROCHLORIDE 10 MILLIGRAM(S): 10 TABLET, FILM COATED ORAL at 11:44
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
## 2021-09-23 NOTE — ED PROVIDER NOTE - NS ED ROS FT
Review of Systems:  CONSTITUTIONAL - No fever  SKIN - No rash  HEMATOLOGIC - No abnormal bleeding or bruising  RESPIRATORY - No shortness of breath, No cough  CARDIAC -No chest pain, No palpitations  GI - No abdominal pain, No nausea, No vomiting  MUSCULOSKELETAL - No joint paint, No swelling, No back pain  NEUROLOGIC - No numbness, No focal weakness, No dizziness  All other systems negative, unless specified in HPI

## 2021-09-23 NOTE — ED PROVIDER NOTE - PHYSICAL EXAMINATION
CONSTITUTIONAL: well developed, well nourished, no acute distress  TRAUMA: ABC intact, GCS 15  HEAD: normocephalic, atraumatic  EYES: PERRL, EOMI, no raccoon eyes  ENT: no nasal discharge, moist mucous membranes  NECK: no midline tenderness, no stepoffs, no deformity, +paraspinal tenderness  CV: regular rate and rhythm, equal distal pulses  RESP: lungs clear to auscultation bilaterally, normal work of breathing, symmetric rise and fall of chest   CHEST: no chest wall tenderness, no crepitus, no clavicular deformity/tenting  ABD: soft, nondistended, nontender, no rebound, no guarding, no rigidity, no seatbelt sign, no pelvic instability  BACK: no midline T/L/S-spine tenderness, no stepoffs, no deformity, +paraspinal lumbar tenderness  EXT: no obvious deformity, no tenderness of extremities, full ROM, cap refill < 2 seconds  SKIN: no rashes, no lacerations, no abrasions  NEURO: A&Ox3, motor strength 5/5 in all extremities, sensation grossly intact throughout, no focal neurological deficits, GCS 15  PSYCH: normal mood, appropriate affect
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
## 2021-09-23 NOTE — ED PROVIDER NOTE - PATIENT PORTAL LINK FT
You can access the FollowMyHealth Patient Portal offered by [facility] by registering at the following website: [email or web address]. By joining [employer] FollowMyHealth portal, you will also be able to view your health information using other applications (apps) compatible with our system.
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
## 2021-10-10 LAB
GLUCOSE UR-MCNC: NORMAL
GLUCOSE UR-MCNC: NORMAL
HGB UR QL STRIP.AUTO: NORMAL
LEUKOCYTE ESTERASE UR QL STRIP: NORMAL
PROT UR STRIP-MCNC: NORMAL
PROT UR STRIP-MCNC: NORMAL

## 2021-10-11 NOTE — OB PROVIDER H&P - NSTRANFUSIONOBJECTION_GEN_ALL_CORE_SIUH
Reviewed.     Reza Chung MD [address] [place] [address] Patient has no objection to blood transfusions.

## 2022-02-15 ENCOUNTER — APPOINTMENT (OUTPATIENT)
Dept: OBGYN | Facility: CLINIC | Age: 47
End: 2022-02-15
Payer: COMMERCIAL

## 2022-02-15 VITALS
BODY MASS INDEX: 28.14 KG/M2 | WEIGHT: 190 LBS | DIASTOLIC BLOOD PRESSURE: 80 MMHG | SYSTOLIC BLOOD PRESSURE: 118 MMHG | HEIGHT: 69 IN

## 2022-02-15 PROCEDURE — 99214 OFFICE O/P EST MOD 30 MIN: CPT

## 2022-02-15 RX ORDER — NORETHINDRONE ACETATE AND ETHINYL ESTRADIOL, ETHINYL ESTRADIOL AND FERROUS FUMARATE 1MG-10(24)
1 MG-10 MCG / KIT ORAL DAILY
Qty: 3 | Refills: 1 | Status: COMPLETED | COMMUNITY
Start: 2022-02-15 | End: 2022-08-14

## 2022-02-15 NOTE — DISCUSSION/SUMMARY
[FreeTextEntry1] : Pap done\par Self breast exam stressed\par Prescribed bilateral screening mammogram\par Continue oral contraceptives with instructions/precautions\par Follow-up 6 months or as needed

## 2022-02-15 NOTE — HISTORY OF PRESENT ILLNESS
[FreeTextEntry1] : Patient is 46 years old para 3-0-0-3 last menstrual period November 2019.\par She is doing well on oral contraceptives in the form of Lo Loestrin Fe and notes improvement of painful and heavy menstrual periods on oral contraceptives.  Patient request to continue with oral contraceptives at this time and understands risks benefits and alternatives.

## 2022-07-20 ENCOUNTER — APPOINTMENT (OUTPATIENT)
Dept: PAIN MANAGEMENT | Facility: CLINIC | Age: 47
End: 2022-07-20

## 2022-07-25 ENCOUNTER — APPOINTMENT (OUTPATIENT)
Dept: ORTHOPEDIC SURGERY | Facility: CLINIC | Age: 47
End: 2022-07-25

## 2022-07-25 VITALS — WEIGHT: 190 LBS | HEIGHT: 69 IN | BODY MASS INDEX: 28.14 KG/M2

## 2022-07-25 DIAGNOSIS — M25.512 PAIN IN LEFT SHOULDER: ICD-10-CM

## 2022-07-25 PROCEDURE — 73110 X-RAY EXAM OF WRIST: CPT | Mod: LT

## 2022-07-25 PROCEDURE — 73030 X-RAY EXAM OF SHOULDER: CPT | Mod: LT

## 2022-07-25 PROCEDURE — 99072 ADDL SUPL MATRL&STAF TM PHE: CPT

## 2022-07-25 PROCEDURE — 99213 OFFICE O/P EST LOW 20 MIN: CPT

## 2022-07-25 NOTE — PHYSICAL EXAM
[Sitting] : sitting [Moderate] : moderate [3 ___] : forward flexion 3[unfilled]/5 [3___] : internal rotation 3[unfilled]/5 [Left] : left hand [Dorsal Wrist] : dorsal wrist [Volar Wrist] : volar wrist [Wrist Dorsiflexion] : wrist dorsiflexion [Wrist Volarflexion] : wrist volarflexion [Radial Deviation] : radial deviation [Ulnar Deviation] : ulnar deviation [5___] : pinch 5[unfilled]/5 [TWNoteComboBox7] : active forward flexion 50 degrees [TWNoteComboBox4] : passive forward flexion 80 degrees [de-identified] : active abduction 50 degrees [TWNoteComboBox9] : passive abduction 80 degrees [TWNoteComboBox6] : internal rotation lateral hip [de-identified] : external rotation 85 degrees [] : no tenderness over hand [FreeTextEntry9] :  mild limited ROM of wrist secondary to pain

## 2022-07-25 NOTE — IMAGING
[de-identified] :  X-rays taken of the patient's left shoulder in the office today revealed No obvious fractures, subluxations, or dislocations.  No significant abnormalities are seen.\par \par X-rays taken of the patient's left wrist in the office today revealed No obvious fractures, subluxations, or dislocations.  No significant abnormalities are seen.

## 2022-07-25 NOTE — DISCUSSION/SUMMARY
[de-identified] :   Patient will take Mobic and cyclobenzaprine which he has at home to help alleviate her symptoms.  The patient was advised to rest/ice the area and can alternate with warm compresses as needed.  Instructed not to do any strenuous activity that would further aggravate their symptoms.\par \par Gentle ROM exercises in Epsom salt and warm water was encouraged.  Explained to the patient that this may take 4-6 weeks to fully heal and that the first two weeks are usually the worst.    For her wrist sprain, she was placed in a cock-up wrist brace for support / stability.\par \par A script for physical therapy was printed for the patient so they can get started on that.  The show conditioning program from the [facility] was printed and given to the patient so she may try that at home.  \par \par MRI of the left shoulder ordered for further evaluation.  Patient was advised to call the office a few days after getting the MRI done to discuss results over the phone.\par \par Patient has a 100% temporary total disability and will be out of work until her next evaluation.  Patient will follow-up in four weeks for further evaluation.  All of the patient's questions/concerns were answered in detail.  \par \par The patient was seen under the supervision of Dr. Xiao.
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
## 2022-08-02 ENCOUNTER — APPOINTMENT (OUTPATIENT)
Dept: MRI IMAGING | Facility: CLINIC | Age: 47
End: 2022-08-02

## 2022-08-02 RX ORDER — NORETHINDRONE ACETATE AND ETHINYL ESTRADIOL, ETHINYL ESTRADIOL AND FERROUS FUMARATE 1MG-10(24)
1 MG-10 MCG / KIT ORAL DAILY
Qty: 1 | Refills: 0 | Status: ACTIVE | COMMUNITY
Start: 2022-08-02 | End: 1900-01-01

## 2022-08-11 ENCOUNTER — APPOINTMENT (OUTPATIENT)
Dept: OBGYN | Facility: CLINIC | Age: 47
End: 2022-08-11

## 2022-08-29 ENCOUNTER — APPOINTMENT (OUTPATIENT)
Dept: OBGYN | Facility: CLINIC | Age: 47
End: 2022-08-29

## 2022-08-29 VITALS
DIASTOLIC BLOOD PRESSURE: 74 MMHG | HEIGHT: 69 IN | WEIGHT: 195 LBS | SYSTOLIC BLOOD PRESSURE: 120 MMHG | BODY MASS INDEX: 28.88 KG/M2

## 2022-08-29 DIAGNOSIS — Z01.419 ENCOUNTER FOR GYNECOLOGICAL EXAMINATION (GENERAL) (ROUTINE) W/OUT ABNORMAL FINDINGS: ICD-10-CM

## 2022-08-29 DIAGNOSIS — Z30.41 ENCOUNTER FOR SURVEILLANCE OF CONTRACEPTIVE PILLS: ICD-10-CM

## 2022-08-29 PROCEDURE — 99396 PREV VISIT EST AGE 40-64: CPT

## 2022-08-29 RX ORDER — NORETHINDRONE ACETATE AND ETHINYL ESTRADIOL, ETHINYL ESTRADIOL AND FERROUS FUMARATE 1MG-10(24)
1 MG-10 MCG / KIT ORAL DAILY
Qty: 3 | Refills: 1 | Status: COMPLETED | COMMUNITY
Start: 2022-08-29 | End: 2023-02-25

## 2022-08-29 NOTE — HISTORY OF PRESENT ILLNESS
[FreeTextEntry1] : Patient is 46 years old para 3-0-0-3 last menstrual period November 2019.\par She is doing well on oral contraceptives in the form of low Loestrin FE and notes decreased pain and light to nonexistent periods.  She would like to continue with present oral contraceptive.

## 2022-09-01 ENCOUNTER — APPOINTMENT (OUTPATIENT)
Dept: ORTHOPEDIC SURGERY | Facility: CLINIC | Age: 47
End: 2022-09-01

## 2022-09-01 PROCEDURE — 99213 OFFICE O/P EST LOW 20 MIN: CPT

## 2022-09-01 PROCEDURE — 99072 ADDL SUPL MATRL&STAF TM PHE: CPT

## 2022-09-01 NOTE — PHYSICAL EXAM
[Sitting] : sitting [Moderate] : moderate [3 ___] : forward flexion 3[unfilled]/5 [3___] : internal rotation 3[unfilled]/5 [Left] : left hand [Dorsal Wrist] : dorsal wrist [Volar Wrist] : volar wrist [Wrist Dorsiflexion] : wrist dorsiflexion [Wrist Volarflexion] : wrist volarflexion [Radial Deviation] : radial deviation [Ulnar Deviation] : ulnar deviation [5___] : pinch 5[unfilled]/5 [TWNoteComboBox7] : active forward flexion 50 degrees [TWNoteComboBox4] : passive forward flexion 80 degrees [de-identified] : active abduction 50 degrees [TWNoteComboBox9] : passive abduction 80 degrees [TWNoteComboBox6] : internal rotation lateral hip [de-identified] : external rotation 85 degrees [] : no tenderness over hand [FreeTextEntry9] :  mild limited ROM of wrist secondary to pain

## 2022-09-01 NOTE — DISCUSSION/SUMMARY
[de-identified] :   Patient will take Mobic and cyclobenzaprine which he has at home to help alleviate her symptoms.  The patient was advised to rest/ice the area and can alternate with warm compresses as needed.  Instructed not to do any strenuous activity that would further aggravate their symptoms.\par \par Gentle ROM exercises in Epsom salt and warm water was encouraged.  Explained to the patient that this may take 4-6 weeks to fully heal and that the first two weeks are usually the worst.    For her wrist sprain, she was placed in a cock-up wrist brace for support / stability.\par \par A script for physical therapy was printed for the patient so they can get started on that.  The show conditioning program from the [facility] was printed and given to the patient so she may try that at home.  \par \par MRI of the left shoulder ordered for further evaluation.  Patient was advised to call the office a few days after getting the MRI done to discuss results over the phone.\par \par Patient has a 100% temporary total disability and will be out of work until her next evaluation.  Patient will follow-up in four weeks for further evaluation.  All of the patient's questions/concerns were answered in detail.  \par \par The patient was seen under the supervision of Dr. Xiao.

## 2022-09-01 NOTE — DATA REVIEWED
[MRI] : MRI [Left] : left [Shoulder] : shoulder [Report was reviewed and noted in the chart] : The report was reviewed and noted in the chart [FreeTextEntry1] :  MRI left shoulder 08/11/2022:  No fracture low grade intrasubstance supraspinatus tear of fibers mild subacromial bursitis
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
## 2022-09-01 NOTE — IMAGING
[de-identified] :  X-rays taken of the patient's left shoulder in the office today revealed No obvious fractures, subluxations, or dislocations.  No significant abnormalities are seen.\par \par X-rays taken of the patient's left wrist in the office today revealed No obvious fractures, subluxations, or dislocations.  No significant abnormalities are seen.\par  left shoulder mild limits in motion all planes tested with impingement\par  good left wrist motion minimal pain
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
## 2022-10-05 ENCOUNTER — APPOINTMENT (OUTPATIENT)
Dept: PAIN MANAGEMENT | Facility: CLINIC | Age: 47
End: 2022-10-05

## 2022-10-05 ENCOUNTER — NON-APPOINTMENT (OUTPATIENT)
Age: 47
End: 2022-10-05

## 2022-10-05 VITALS — HEART RATE: 90 BPM | DIASTOLIC BLOOD PRESSURE: 77 MMHG | SYSTOLIC BLOOD PRESSURE: 97 MMHG

## 2022-10-05 PROCEDURE — 99215 OFFICE O/P EST HI 40 MIN: CPT | Mod: 25

## 2022-10-05 PROCEDURE — 99072 ADDL SUPL MATRL&STAF TM PHE: CPT

## 2022-10-05 PROCEDURE — 96136 PSYCL/NRPSYC TST PHY/QHP 1ST: CPT | Mod: 59

## 2022-10-05 NOTE — ASSESSMENT
[FreeTextEntry1] : 46 year old female presenting with ongoing lower back and neck pain. As for her lower back pain, we are awaiting to perform a micro-diskectomy at a surgery center. As for her neck pain, she will continue with conservative care. Follow up in 6 weeks will be made for reassessment.\par \par We encourage a home exercise program, stressing walking and strengthening of the core. We have recommended exercises such as the modified plank, Sury exercise, elliptical , recumbent bike, as well as shoulder griddle strengthening. We discussed recreational activities such as swimming, Lupillo Chi and yoga. Use things that heat like hot shower or icy heat before rehab and exercising and at the beginning of the day, and ice (ice in a bag never directly on the skin) after activity and at the end of the day.\par \par No medications were given at todays visit.\par \par Neuropsychological SOAPP and PCS testing was performed as an evaluation of cognition, mood, personality, behavior to assess likelihood of addiction, misuse, other aberrant medication-related behaviors, and different thoughts and feelings that may be associated with pain. The total time spent rendering and interpreting the service was approximately 20 minutes. Results will be implemented in the appropriate care of the patient\par \par A total of 33 minutes was spent on this visit, reviewing previous notes/consultations/imaging, counseling the patient on appropriate biomechanics impacting the pain, ordering tests if needed, refilling meds if needed, and documenting the findings in the note.\par \par Entered by Bernarda Ma, acting as scribe for Dr. Ramirez.\par  \par The documentation recorded by the scribe, in my presence, accurately reflects the service I personally performed, and the decisions made by me with my edits as appropriate.\par  \par Best Regards, \par Cyrus Ramirez MD \par Board Certified, Anesthesiology \par Board Certified, Pain Medicine\par \par

## 2022-10-05 NOTE — PHYSICAL EXAM
[de-identified] : Constitutional\par GENERAL APPEARANCE OF PATIENT IS WELL DEVELOPED, WELL NOURISHED, BODY HABITUS NORMAL, WELL GROOMED, NO DEFORMITIES NOTED. \par \par Head\par -          Atraumatic and Normocephalic \par \par Eyes, Nose, and Throat:\par -          External inspection of ears and nose are normal overall without scars, lesions, or masses noted\par -          Assessment of hearing is normal\par \par Neck\par -          Examination of neck shows no masses, overall appearance is normal, neck is symmetric, tracheal position is midline, no crepitus is noted\par -          Examination of thyroid shows no enlargement, tenderness or masses\par \par Respiratory\par -          Assessment of respiratory effort shows no intercostal retractions, no use of accessory muscles, unlabored breathing, and normal diaphragmatic movement.\par \par Cardiovascular\par -          Examination of extremities show no edema or varicosities\par \par Musculoskeletal\par -           Inspection and palpation of digits and nails shows no clubbing, cyanosis, nodules, drainage, fluctuance, petechiae\par \par 1)         Spine- Palpation of the thoracic/lumbar spine is as follows: bilateral lumbar paraspinal tenderness. numbness/tingling left leg and numbness/tingling right leg. Range of motion of the thoracic and lumbar spine is as follows: Diminished range of motion in all planes. pain at extremes of flexion. \par \par 2)         Neck- Palpation of the cervical spine is as follows: bilateral paracervical tenderness. Range of motion of the cervical spine is as follows: Pain at extremes of rotation to left. \par \par 3)         RUE- inspection and palpation shows no misalignment, asymmetry, crepitation, defects, tenderness, masses, effusions. ROM is normal without crepitation or contracture. No instability or subluxation or laxity is noted. No abnormal movements.\par \par 4)         LUE-inspection and palpation shows no misalignment, asymmetry, crepitation, defects, tenderness, masses, effusions. ROM is normal without crepitation or contracture. No instability or subluxation or laxity is noted. No abnormal movements.\par \par 5)         RLE- inspection and palpation shows no misalignment, asymmetry, crepitation, defects, tenderness, masses, effusions. ROM is normal without crepitation or contracture. No instability or subluxation or laxity is noted. No abnormal movements.\par \par 6)         LLE-inspection and palpation shows no misalignment, asymmetry, crepitation, defects, tenderness, masses, effusions. ROM is normal without crepitation or contracture. No instability or subluxation or laxity is noted. No abnormal movements.\par \par Skin\par -           Inspection of skin and subcutaneous tissue shows no rashes, lesions or ulcers\par -           Palpation of skin and subcutaneous tissue shows no rashes, no indurations, subcutaneous nodules or tightening.\par \par  Abdomen\par -           Soft; Non-tender\par \par Neurologic\par -           CN 2-12 are grossly intact\par -           No sensory or motor deficits in the upper and lower extremities\par -           Adequate strength in upper and lower extremities\par \par Psychiatric\par -           Patients judgment and insight are intact\par -           Oriented to time, place and person\par -           Recent and remote memory intact.\par

## 2022-10-05 NOTE — DATA REVIEWED
[FreeTextEntry1] : X-ray of the cervical spine taken on 10/08/2021 showed degenerative changes in the lower cervical spine. 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 Straightening of the normal cervical lordosis, likely on the basis of muscle spasm.\par \par MRI of the cervical spine taken on 11/06/2021 showed C5-6 broad posterior subligamentous disc herniation with surrounding bony ridge formation eccentric to the left impressing on the left greater than right ventral cord and there was brought extension of disc peripherally into the proximal foramen bilaterally, left greater than right accompanied by ankle vertebral joint hypertrophy which is causing bilateral left predominant C6 nerve root impressions with left greater than right foraminal stenosis. C6-7 broad right predominant posterior disc herniation impressing on the ventral and right ventral margin of the cord. C3-4 and C4-5 posterior disc bulges impressing on the ventral thecal sac, nearly abutting the ventral cord at C4-5. C7-T1 some hypertrophy of the facets encroaching toward the foramen. Congenital fusion of C2 with 3. Cervical kyphosis with the apex of C5-6.\par \par X-ray of the lumbar spine taken on 10/08/2021 showed no fracture. Sacralization of L5.\par \par MRI of the lumbar spine taken on 11/07/2021 showed L2-3 disc bulge which impinges upon the thecal sac. L3-4 through L5-S1 disc bulges which impresses upon the thecal sac and partially compromises the adjacent neural foramina bilaterally. Bilateral articular facet hypertrophy at L3-4 through L5-S1. Grade 1 spondylolisthesis involving L4 and L5. L4-5 spinal stenosis.\par \par CT scan of the lumbar spine taken on 03/03/2022 showed post diskography CT findings as follows; L1-2, contrast anteriorly is present centrally in the disc space. There is no focal herniation, central or foraminal stenosis. L2-3, contrast material is present predominantly centrally in the disc space and there is mild annular degeneration posteriorly, contrast does not extend to the margin of the vertebral body, in keeping with modified Allis grade 2. There is no focal herniation, central or foraminal stenosis. L3-4, contrast material is seen and extends posteriorly to the left of midline just beyond the margin of the vertebral body, associated with left foraminal protrusion, in keeping with modified Allis grade 4. There is disc bulge with mild central stenosis. L4-5, contrast material seen within the right aspect of the disc and there is annular degeneration laterally. This is in keeping with modified Allis grade 2. There is disc bulge together with facet and ligamentum flavum hypertrophy causing moderate central stenosis and mild-to-moderate bilateral foraminal stenosis. L5-S1, contrast material is present within the right aspect of the disc extending into the Schmorl's node at the inferior endplate of L5 to right of midline and also extending anteriorly to the margin of vertebral body in keeping with modified Allis grade 3. There is osteophyte complex. With facet hypertrophy causing mild-to-moderate central and bilateral foraminal stenosis.\par \par SOAPP: Scored a 0 , low risk.\par  \par [place] REGISTRY: Reviewed .  \par  \par UDS: No data obtained today. \par  \par Medications that trigger a UDS: Benzodiazepines (Ativan, Xanax, Valium) etc, Barbiturates, Narcotics (Avinza, Butrans, hydrocodone, Codeine, Yary, Methadone, Morphine, MS Contin, Opana, oxycodone, Oxycontin, Suboxone etc), Pregabalin (Lyrica), Tramadol (Ultacet, Utram etc), Tapentadol, (Nucynta) and Elist Drugs (cocaine, THC, Etc.)\par  \par Risk factors: Bipolar Illness, positive for any an illicit drugs, history of any ETOH and drug abuse, any signs of diversion, Sharing Meds, selling meds. Non consistent [place] drug reporting and above 120meq of morphine\par  \par Low risk: Patient has combination of a low risk SOAP and no risk factors. UDS would be repeated randomly every quarter\par
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
## 2022-10-17 ENCOUNTER — NON-APPOINTMENT (OUTPATIENT)
Age: 47
End: 2022-10-17

## 2022-10-26 ENCOUNTER — APPOINTMENT (OUTPATIENT)
Dept: ORTHOPEDIC SURGERY | Facility: CLINIC | Age: 47
End: 2022-10-26

## 2022-10-26 DIAGNOSIS — S43.402D UNSPECIFIED SPRAIN OF LEFT SHOULDER JOINT, SUBSEQUENT ENCOUNTER: ICD-10-CM

## 2022-10-26 DIAGNOSIS — S63.502A UNSPECIFIED SPRAIN OF LEFT WRIST, INITIAL ENCOUNTER: ICD-10-CM

## 2022-10-26 DIAGNOSIS — S40.012A CONTUSION OF LEFT SHOULDER, INITIAL ENCOUNTER: ICD-10-CM

## 2022-10-26 PROCEDURE — 99213 OFFICE O/P EST LOW 20 MIN: CPT

## 2022-10-26 PROCEDURE — 99072 ADDL SUPL MATRL&STAF TM PHE: CPT

## 2022-10-26 NOTE — PHYSICAL EXAM
[Sitting] : sitting [Moderate] : moderate [3 ___] : forward flexion 3[unfilled]/5 [3___] : internal rotation 3[unfilled]/5 [Left] : left hand [Dorsal Wrist] : dorsal wrist [Volar Wrist] : volar wrist [Wrist Dorsiflexion] : wrist dorsiflexion [Wrist Volarflexion] : wrist volarflexion [Radial Deviation] : radial deviation [Ulnar Deviation] : ulnar deviation [5___] : pinch 5[unfilled]/5 [TWNoteComboBox7] : active forward flexion 50 degrees [TWNoteComboBox4] : passive forward flexion 80 degrees [de-identified] : active abduction 50 degrees [TWNoteComboBox9] : passive abduction 80 degrees [TWNoteComboBox6] : internal rotation lateral hip [de-identified] : external rotation 85 degrees [] : no tenderness over hand [FreeTextEntry9] :  mild limited ROM of wrist secondary to pain
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
## 2022-10-26 NOTE — IMAGING
[de-identified] :  X-rays taken of the patient's left shoulder in the office today revealed No obvious fractures, subluxations, or dislocations.  No significant abnormalities are seen.\par \par X-rays taken of the patient's left wrist in the office today revealed No obvious fractures, subluxations, or dislocations.  No significant abnormalities are seen.\par  left shoulder mild limits in motion all planes tested with impingement\par  good left wrist motion minimal pain
[de-identified] :  X-rays taken of the patient's left shoulder in the office today revealed No obvious fractures, subluxations, or dislocations.  No significant abnormalities are seen.\par \par X-rays taken of the patient's left wrist in the office today revealed No obvious fractures, subluxations, or dislocations.  No significant abnormalities are seen.\par  left shoulder mild limits in motion all planes tested with impingement\par  good left wrist motion minimal pain
[de-identified] :  X-rays taken of the patient's left shoulder in the office today revealed No obvious fractures, subluxations, or dislocations.  No significant abnormalities are seen.\par \par X-rays taken of the patient's left wrist in the office today revealed No obvious fractures, subluxations, or dislocations.  No significant abnormalities are seen.\par  left shoulder mild limits in motion all planes tested with impingement\par  good left wrist motion minimal pain
Single

## 2022-10-26 NOTE — PHYSICAL EXAM
[Sitting] : sitting [Moderate] : moderate [3 ___] : forward flexion 3[unfilled]/5 [3___] : internal rotation 3[unfilled]/5 [Left] : left hand [Dorsal Wrist] : dorsal wrist [Volar Wrist] : volar wrist [Wrist Dorsiflexion] : wrist dorsiflexion [Wrist Volarflexion] : wrist volarflexion [Radial Deviation] : radial deviation [Ulnar Deviation] : ulnar deviation [5___] : pinch 5[unfilled]/5 [TWNoteComboBox7] : active forward flexion 50 degrees [TWNoteComboBox4] : passive forward flexion 80 degrees [de-identified] : active abduction 50 degrees [TWNoteComboBox9] : passive abduction 80 degrees [TWNoteComboBox6] : internal rotation lateral hip [de-identified] : external rotation 85 degrees [] : no tenderness over hand [FreeTextEntry9] :  mild limited ROM of wrist secondary to pain

## 2022-10-26 NOTE — PHYSICAL EXAM
[Sitting] : sitting [Moderate] : moderate [3 ___] : forward flexion 3[unfilled]/5 [3___] : internal rotation 3[unfilled]/5 [Left] : left hand [Dorsal Wrist] : dorsal wrist [Volar Wrist] : volar wrist [Wrist Dorsiflexion] : wrist dorsiflexion [Wrist Volarflexion] : wrist volarflexion [Radial Deviation] : radial deviation [Ulnar Deviation] : ulnar deviation [5___] : pinch 5[unfilled]/5 [TWNoteComboBox7] : active forward flexion 50 degrees [TWNoteComboBox4] : passive forward flexion 80 degrees [de-identified] : active abduction 50 degrees [TWNoteComboBox9] : passive abduction 80 degrees [TWNoteComboBox6] : internal rotation lateral hip [de-identified] : external rotation 85 degrees [] : no tenderness over hand [FreeTextEntry9] :  mild limited ROM of wrist secondary to pain

## 2022-10-26 NOTE — ASSESSMENT
[FreeTextEntry1] :  therapy has been approved should  continue  also continue her medications at this time she  did go back to work at this time has a mild  to moderate partial temporary disability I will see her in a few months we did discuss the possibility of a cortisone injection she was not ready to have 1 today

## 2022-11-08 ENCOUNTER — LABORATORY RESULT (OUTPATIENT)
Age: 47
End: 2022-11-08

## 2022-11-08 ENCOUNTER — APPOINTMENT (OUTPATIENT)
Dept: PAIN MANAGEMENT | Facility: CLINIC | Age: 47
End: 2022-11-08

## 2022-11-08 PROCEDURE — 00630 ANES PX LUMBAR REGION NOS: CPT | Mod: QZ,P2

## 2022-11-08 PROCEDURE — 63056 DECOMPRESS SPINAL CORD LMBR: CPT

## 2022-11-08 PROCEDURE — 99072 ADDL SUPL MATRL&STAF TM PHE: CPT

## 2022-11-08 PROCEDURE — 22526 IDET SINGLE LEVEL: CPT

## 2022-11-08 PROCEDURE — 62287 DCMPRN PQ NUC PUL 1/MLT LMBR: CPT

## 2022-11-08 PROCEDURE — 99072 ADDL SUPL MATRL&STAF TM PHE: CPT | Mod: QZ

## 2022-11-08 NOTE — PROCEDURE
[FreeTextEntry1] : DISCECTOMY, NUCLEUS PULPOSUS ABLATION AND ANNULOPLASTY [FreeTextEntry3] : Date:  2022\par \par Patient: ROBB ORTEZ\par \par - 1975\par \par \par \par Preoperative Diagnosis: Lumbar Disc Displacement\par Postoperative Diagnosis: Same\par \par \par \par PROCEDURE:    1. DISCECTOMY\par                             2. NUCLEUS PULPOSUS ABLATION\par                             3. ANNULOPLASTY\par                             4. CONTRAST INJECTION and EVALUATION of NUCLEOGRAMS\par \par \par Physician: Cyrus Ramirze M.D.\par Anesthesiologist/CRNA: Mr. Garcia\par Anesthesia: MAC, See Nurses Notes, Versed 6mg Fentanyl 150mcg\par \par \par \par \par \par Medical Necessity:  Failure of conservative management.\par Consent:  Though unusual, the possible complications including infection, bleeding, nerve damage, hospital admission, stroke, pneumothorax, death or failure of the procedure are theoretically possible. The patient was educated about the of the procedure and alternative therapies. All questions were answered and the patient freely gave consent to proceed.\par Indication for Fluoroscopy:  This procedure requires the precise placement of the spinal needle into the epidural space.  It is the only way to accurately and safely perform the injection.\par Monitoring:  Patient had continuous blood pressure, EKG, and pulse oximetry throughout the case. See nurse's notes.\par \par \par \par Procedure: After obtaining written consent, the patient received 1 gram of IV Ancef and was brought to the operating room one hour later. The patient was placed prone on a radiolucent table with a pillow placed under the abdomen for optimal positioning. The lumbar spine was prepped and draped in sterile fashion. A time out was performed. \par \par  The image intensifier was enclosed in a sterile cover. The entry site was marked using a sterile pen 8-10 cm from the midline on the affected side using fluoroscopic guidance. After scrubbing with a povidone-iodine brush and sponge I was dressed in a sterile gown with latex-free, powder-free gloves. For this procedure, strict surgical sterile technique was done.\par \par \par \par  The skin and subcutaneous tissues were anesthetized with 1% Lidocaine using a 25 gauge needle. A standard 18 gauge, 8 inch long spinal needle was inserted through the marked entry point at a 45 degree angle to the skin. The needle was advanced to the appropriate superior articular process until it was contacted. AP and lateral fluoroscopic views were obtained to confirm needle placement. During the advancement necessary adjustments were made to assure the proper placement of the needle. The needle was repositioned just lateral to the superior articular process and advanced into the center of the disc under lateral fluoroscopic imaging. The needle tip placement was within the midline of the disc on the AP view, at the junction of the posterior third and the middle third of the disc on the lateral view.\par  \par DISCOGRAPHY: The L2-3 disc was injected with 0.5 cc of Omnipaque 240 mixed with Ancef in Normal Saline verifying intranuclear placement and outlined the nuclear-annulus interface. Findings posterior lekage and disc degeneration \par \par \par DISCECTOMY: The guide wire is threaded through the needle into the disc nucleus. The spinal needle is withdrawn while holding the guide wire in place. A 3-4 mm skin incision is made at the needle entry site using a #11 scalpel. The working cannula and dilator joined together were placed over the guide wire and advanced toward the annulus. An annulotomy was created by applying slight pressure and a 360 degree rotation of the dilator 1-3 turns. The cannula with the dilator was advanced under fluoroscopic guidance into the nucleus. The dilator was then removed creating a portal into the disc. The depth stop was advanced to the patient's skin and secured to prevent advancement of the cannula. A standard 2.5 mm diameter endoscopic grasping forceps was used to manually extract nucleus material around 2 ccc that was sent to pathology.\par \par NUCLEUS ABLATION: Normal saline irrigation tubing was connected to the fluid portal of the radiofrequency instrument. The radiofrequency unit is set at a power setting of 25. The radiofrequency energy was activated using footswitch activation while the electrode tracks were directed into the 11, 12, 1, 5, 6 and 7 o'clock positions in order to accomplish a nucleus pulposus ablation.\par \par ANNULOPLASTY: The cannula was withdrawn 3-4 mm to achieve proper positioning to accomplish the annuloplasty. The radiofrequency unit then set at 15 was applied to the annulus. The radiofrequency energy was activated by the footswitch while the electrode was directed in the 10, 11, 12, 1 and 2 o'clock positions in order to accomplish the annuloplasty. The cannula was subsequently withdrawn to a position just outside the annulus where 1cc of depomedrol was deposited. The radiofrequency instrument and cannula were clearly and easily extracted from the patient. A sterile bandage was applied to the surgical entry site.\par \par \par \par Findings: Lumbar Spine AP and oblique views with x-ray degenerative changes noted.\par Complications: none. \par \par Disposition: I have examined the patient and there are no new physical findings since original presentation. The patient was discharged home with a . The discharge instruction sheet was given to the patient. Motor function was intact.\par \par Comment: 2 week follow up in office. Call if any problems\par \par \par \par \par \par \par \par \par Cyrus Ramirez MD \par Board Certified, Anesthesiology \par Board Certified, Pain Medicine  \par \par \par \par

## 2022-11-09 RX ORDER — CEPHALEXIN 500 MG/1
500 TABLET ORAL
Qty: 20 | Refills: 0 | Status: ACTIVE | COMMUNITY
Start: 2022-11-09 | End: 1900-01-01

## 2022-11-28 ENCOUNTER — APPOINTMENT (OUTPATIENT)
Dept: ORTHOPEDIC SURGERY | Facility: CLINIC | Age: 47
End: 2022-11-28

## 2022-11-28 ENCOUNTER — NON-APPOINTMENT (OUTPATIENT)
Age: 47
End: 2022-11-28

## 2022-12-01 ENCOUNTER — APPOINTMENT (OUTPATIENT)
Dept: ORTHOPEDIC SURGERY | Facility: CLINIC | Age: 47
End: 2022-12-01

## 2022-12-07 ENCOUNTER — APPOINTMENT (OUTPATIENT)
Dept: PAIN MANAGEMENT | Facility: CLINIC | Age: 47
End: 2022-12-07

## 2022-12-07 VITALS — WEIGHT: 195 LBS | BODY MASS INDEX: 28.88 KG/M2 | HEIGHT: 69 IN

## 2022-12-07 DIAGNOSIS — M54.12 RADICULOPATHY, CERVICAL REGION: ICD-10-CM

## 2022-12-07 DIAGNOSIS — M51.26 OTHER INTERVERTEBRAL DISC DEGENERATION, LUMBAR REGION: ICD-10-CM

## 2022-12-07 DIAGNOSIS — M51.36 OTHER INTERVERTEBRAL DISC DEGENERATION, LUMBAR REGION: ICD-10-CM

## 2022-12-07 PROCEDURE — 99215 OFFICE O/P EST HI 40 MIN: CPT

## 2022-12-07 PROCEDURE — 99072 ADDL SUPL MATRL&STAF TM PHE: CPT

## 2022-12-07 NOTE — ASSESSMENT
[FreeTextEntry1] : 46 year old female presenting with much improved lower back pain secondary to a microdiscectomy. At the present, she will continue with conservative care. As for her neck pain, she will continue with conservative care as well. She can follow up as needed. \par \par Entered by Bernarda Ma, acting as scribe for Dr. Ramirez.\par  \par The documentation recorded by the scribe, in my presence, accurately reflects the service I personally performed, and the decisions made by me with my edits as appropriate.\par  \par Best Regards, \par Cyrus Ramirez MD \par Board Certified, Anesthesiology \par Board Certified, Pain Medicine\par \par
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
## 2022-12-18 ENCOUNTER — FORM ENCOUNTER (OUTPATIENT)
Age: 47
End: 2022-12-18

## 2023-07-06 ENCOUNTER — APPOINTMENT (OUTPATIENT)
Dept: OBGYN | Facility: CLINIC | Age: 48
End: 2023-07-06
Payer: COMMERCIAL

## 2023-07-06 VITALS
BODY MASS INDEX: 30.07 KG/M2 | HEIGHT: 69 IN | DIASTOLIC BLOOD PRESSURE: 72 MMHG | WEIGHT: 203 LBS | SYSTOLIC BLOOD PRESSURE: 116 MMHG

## 2023-07-06 DIAGNOSIS — N92.0 EXCESSIVE AND FREQUENT MENSTRUATION WITH REGULAR CYCLE: ICD-10-CM

## 2023-07-06 DIAGNOSIS — N94.6 DYSMENORRHEA, UNSPECIFIED: ICD-10-CM

## 2023-07-06 DIAGNOSIS — Z30.41 ENCOUNTER FOR SURVEILLANCE OF CONTRACEPTIVE PILLS: ICD-10-CM

## 2023-07-06 PROCEDURE — 99214 OFFICE O/P EST MOD 30 MIN: CPT

## 2023-07-06 RX ORDER — NORETHINDRONE ACETATE AND ETHINYL ESTRADIOL, ETHINYL ESTRADIOL AND FERROUS FUMARATE 1MG-10(24)
1 MG-10 MCG / KIT ORAL DAILY
Qty: 3 | Refills: 1 | Status: COMPLETED | COMMUNITY
Start: 2023-07-06 | End: 2024-01-02

## 2023-07-06 NOTE — HISTORY OF PRESENT ILLNESS
[FreeTextEntry1] : Patient is 47 years old para 3-0-0-3 last menstrual period November 2019.\par She is doing well on oral contraceptives in the form of Lo Loestrin FE and notes improvement of painful heavy menstrual periods on oral contraceptives.  She would like to continue with present oral contraceptives.  She denies smoking.

## 2023-07-06 NOTE — DISCUSSION/SUMMARY
[FreeTextEntry1] : Pap done\par Self breast exam stressed\par Prescribed yearly bilateral screening mammogram\par Continue oral contraceptives with instructions/precautions\par Follow-up 6 months or as needed

## 2023-07-06 NOTE — PHYSICAL EXAM
[Chaperone Present] : A chaperone was present in the examining room during all aspects of the physical examination [FreeTextEntry1] : ana [Appropriately responsive] : appropriately responsive [Alert] : alert [No Acute Distress] : no acute distress [No Lymphadenopathy] : no lymphadenopathy [Regular Rate Rhythm] : regular rate rhythm [No Murmurs] : no murmurs [Clear to Auscultation B/L] : clear to auscultation bilaterally [Soft] : soft [Non-tender] : non-tender [Non-distended] : non-distended [No HSM] : No HSM [No Lesions] : no lesions [No Mass] : no mass [Oriented x3] : oriented x3 [Examination Of The Breasts] : a normal appearance [] : implants [No Masses] : no breast masses were palpable [Labia Majora] : normal [Labia Minora] : normal [Normal] : normal [Uterine Adnexae] : normal

## 2023-09-01 ENCOUNTER — OUTPATIENT (OUTPATIENT)
Dept: OUTPATIENT SERVICES | Facility: HOSPITAL | Age: 48
LOS: 1 days | End: 2023-09-01
Payer: COMMERCIAL

## 2023-09-01 VITALS
HEART RATE: 89 BPM | TEMPERATURE: 98 F | SYSTOLIC BLOOD PRESSURE: 121 MMHG | OXYGEN SATURATION: 99 % | RESPIRATION RATE: 16 BRPM | WEIGHT: 199.74 LBS | DIASTOLIC BLOOD PRESSURE: 74 MMHG | HEIGHT: 70 IN

## 2023-09-01 DIAGNOSIS — Z98.82 BREAST IMPLANT STATUS: Chronic | ICD-10-CM

## 2023-09-01 DIAGNOSIS — M20.11 HALLUX VALGUS (ACQUIRED), RIGHT FOOT: ICD-10-CM

## 2023-09-01 DIAGNOSIS — Z01.818 ENCOUNTER FOR OTHER PREPROCEDURAL EXAMINATION: ICD-10-CM

## 2023-09-01 DIAGNOSIS — Z98.890 OTHER SPECIFIED POSTPROCEDURAL STATES: Chronic | ICD-10-CM

## 2023-09-01 LAB
ALBUMIN SERPL ELPH-MCNC: 4.4 G/DL — SIGNIFICANT CHANGE UP (ref 3.5–5.2)
ALP SERPL-CCNC: 64 U/L — SIGNIFICANT CHANGE UP (ref 30–115)
ALT FLD-CCNC: 10 U/L — SIGNIFICANT CHANGE UP (ref 0–41)
ANION GAP SERPL CALC-SCNC: 12 MMOL/L — SIGNIFICANT CHANGE UP (ref 7–14)
APTT BLD: 31.6 SEC — SIGNIFICANT CHANGE UP (ref 27–39.2)
AST SERPL-CCNC: 14 U/L — SIGNIFICANT CHANGE UP (ref 0–41)
BASOPHILS # BLD AUTO: 0.03 K/UL — SIGNIFICANT CHANGE UP (ref 0–0.2)
BASOPHILS NFR BLD AUTO: 0.6 % — SIGNIFICANT CHANGE UP (ref 0–1)
BILIRUB SERPL-MCNC: 0.5 MG/DL — SIGNIFICANT CHANGE UP (ref 0.2–1.2)
BUN SERPL-MCNC: 9 MG/DL — LOW (ref 10–20)
CALCIUM SERPL-MCNC: 9.1 MG/DL — SIGNIFICANT CHANGE UP (ref 8.4–10.5)
CHLORIDE SERPL-SCNC: 103 MMOL/L — SIGNIFICANT CHANGE UP (ref 98–110)
CO2 SERPL-SCNC: 23 MMOL/L — SIGNIFICANT CHANGE UP (ref 17–32)
CREAT SERPL-MCNC: 0.8 MG/DL — SIGNIFICANT CHANGE UP (ref 0.7–1.5)
EGFR: 90 ML/MIN/1.73M2 — SIGNIFICANT CHANGE UP
EOSINOPHIL # BLD AUTO: 0.06 K/UL — SIGNIFICANT CHANGE UP (ref 0–0.7)
EOSINOPHIL NFR BLD AUTO: 1.3 % — SIGNIFICANT CHANGE UP (ref 0–8)
GLUCOSE SERPL-MCNC: 85 MG/DL — SIGNIFICANT CHANGE UP (ref 70–99)
HCT VFR BLD CALC: 41.6 % — SIGNIFICANT CHANGE UP (ref 37–47)
HGB BLD-MCNC: 13.3 G/DL — SIGNIFICANT CHANGE UP (ref 12–16)
IMM GRANULOCYTES NFR BLD AUTO: 0.2 % — SIGNIFICANT CHANGE UP (ref 0.1–0.3)
INR BLD: 0.86 RATIO — SIGNIFICANT CHANGE UP (ref 0.65–1.3)
LYMPHOCYTES # BLD AUTO: 1.43 K/UL — SIGNIFICANT CHANGE UP (ref 1.2–3.4)
LYMPHOCYTES # BLD AUTO: 30.2 % — SIGNIFICANT CHANGE UP (ref 20.5–51.1)
MCHC RBC-ENTMCNC: 29.6 PG — SIGNIFICANT CHANGE UP (ref 27–31)
MCHC RBC-ENTMCNC: 32 G/DL — SIGNIFICANT CHANGE UP (ref 32–37)
MCV RBC AUTO: 92.7 FL — SIGNIFICANT CHANGE UP (ref 81–99)
MONOCYTES # BLD AUTO: 0.37 K/UL — SIGNIFICANT CHANGE UP (ref 0.1–0.6)
MONOCYTES NFR BLD AUTO: 7.8 % — SIGNIFICANT CHANGE UP (ref 1.7–9.3)
NEUTROPHILS # BLD AUTO: 2.84 K/UL — SIGNIFICANT CHANGE UP (ref 1.4–6.5)
NEUTROPHILS NFR BLD AUTO: 59.9 % — SIGNIFICANT CHANGE UP (ref 42.2–75.2)
NRBC # BLD: 0 /100 WBCS — SIGNIFICANT CHANGE UP (ref 0–0)
PLATELET # BLD AUTO: 227 K/UL — SIGNIFICANT CHANGE UP (ref 130–400)
PMV BLD: 12.4 FL — HIGH (ref 7.4–10.4)
POTASSIUM SERPL-MCNC: 4.8 MMOL/L — SIGNIFICANT CHANGE UP (ref 3.5–5)
POTASSIUM SERPL-SCNC: 4.8 MMOL/L — SIGNIFICANT CHANGE UP (ref 3.5–5)
PROT SERPL-MCNC: 7 G/DL — SIGNIFICANT CHANGE UP (ref 6–8)
PROTHROM AB SERPL-ACNC: 9.8 SEC — LOW (ref 9.95–12.87)
RBC # BLD: 4.49 M/UL — SIGNIFICANT CHANGE UP (ref 4.2–5.4)
RBC # FLD: 12.6 % — SIGNIFICANT CHANGE UP (ref 11.5–14.5)
SODIUM SERPL-SCNC: 138 MMOL/L — SIGNIFICANT CHANGE UP (ref 135–146)
WBC # BLD: 4.74 K/UL — LOW (ref 4.8–10.8)
WBC # FLD AUTO: 4.74 K/UL — LOW (ref 4.8–10.8)

## 2023-09-01 PROCEDURE — 99214 OFFICE O/P EST MOD 30 MIN: CPT | Mod: 25

## 2023-09-01 PROCEDURE — 85730 THROMBOPLASTIN TIME PARTIAL: CPT

## 2023-09-01 PROCEDURE — 85025 COMPLETE CBC W/AUTO DIFF WBC: CPT

## 2023-09-01 PROCEDURE — 36415 COLL VENOUS BLD VENIPUNCTURE: CPT

## 2023-09-01 PROCEDURE — 80053 COMPREHEN METABOLIC PANEL: CPT

## 2023-09-01 PROCEDURE — 85610 PROTHROMBIN TIME: CPT

## 2023-09-01 NOTE — H&P PST ADULT - NS MD HP INPLANTS MED DEV
Pt walked in with c/o worsening SOB since yesterday. Pt with Hx of CHF. Tachypneic in triage. Speaking in full sentences. Breast implant

## 2023-09-01 NOTE — H&P PST ADULT - GENITOURINARY COMMENTS
[place] ENCOUNTER          Pt Name: Faby Gatica   MRN: [id number]  Loygfmoris 1944  Date of evaluation: 4/10/2021  Treating Resident Physician: Ehsan Scott MD  Supervising Physician: Dr. Gould       Chief Complaint   Patient presents with    Abdominal Pain     History obtained from the patient. HISTORY OF PRESENT ILLNESS    HPI  Isaias Almazan is a 76 y.o. female with past medical history of CAD, CHF, A. fib, hypertension, hyperlipidemia who presents to the emergency department for evaluation of lower left quadrant abdominal pain that has been waxing and waning over the past day. The pain is nonradiating described as occasional cramping. She also complains of having diarrhea over the past 5 days that is bloody and greater than 10 episodes per day. She is also taking iron pills for anemia. Patient also mentions having nonbilious nonbloody emesis over the past 2 days as well, and has had inability to keep fluids and food down. She has some dyspnea on exertion but states that is typical of her baseline with her heart failure. The patient has no other acute complaints at this time. REVIEW OF SYSTEMS   Review of Systems   Constitutional: Positive for fatigue. Negative for chills and fever. HENT: Negative for rhinorrhea, sinus pain and sore throat. Eyes: Negative for redness. Respiratory: Negative for cough, shortness of breath and wheezing. Cardiovascular: Negative for chest pain. Gastrointestinal: Positive for abdominal pain, blood in stool, diarrhea, nausea and vomiting. Genitourinary: Negative for dysuria. Musculoskeletal: Negative for back pain. Skin: Negative for rash. Neurological: Positive for headaches. Negative for dizziness and light-headedness. Psychiatric/Behavioral: Negative for agitation.          PAST MEDICAL AND SURGICAL HISTORY     Past Medical History: Diagnosis Date    Atrial fibrillation ([facility] [address]     CAD (coronary artery disease)     Congestive heart failure (CHF) (HCC)     Hyperlipidemia     Hypertension      Past Surgical History:   Procedure Laterality Date    ATRIAL ABLATION SURGERY      CARDIAC CATHETERIZATION      CARDIAC VALVE REPLACEMENT      CORONARY ANGIOPLASTY WITH STENT PLACEMENT  02/28/2019    CORONARY ARTERY BYPASS GRAFT      x1    HYSTERECTOMY      MITRAL VALVE REPLACEMENT      PACEMAKER PLACEMENT  05/2019    MO OFFICE/OUTPT VISIT,PROCEDURE ONLY N/A 11/12/2018    CABG X3, MITRAL VALVE REPAIR, MAZE, AND TRICUSPID VALVE REPAIR, DEANNA performed by Agapito Muse MD at [address] [facility] Right 3/14/2021    HIP TOTAL ARTHROPLASTY performed by Mariia Peace MD at [address] [facility]     Current Facility-Administered Medications:     pantoprazole (PROTONIX) injection 40 mg, 40 mg, Intravenous, Daily, 40 mg at 04/10/21 0850 **AND** sodium chloride (PF) 0.9 % injection 10 mL, 10 mL, Intravenous, Daily, You Crowell MD, 10 mL at 04/10/21 0850    0.9 % sodium chloride infusion, , Intravenous, PRN, You Crowell MD    Current Outpatient Medications:     citalopram (CELEXA) 20 MG tablet, Take 1 tablet by mouth daily, Disp: 30 tablet, Rfl: 3    docusate sodium (COLACE, DULCOLAX) 100 MG CAPS, Take 100 mg by mouth 2 times daily as needed for Constipation, Disp: , Rfl:     ferrous sulfate (IRON 325) 325 (65 Fe) MG tablet, Take 1 tablet by mouth 3 times daily (with meals), Disp: 30 tablet, Rfl: 3    folic acid (FOLVITE) 1 MG tablet, Take 1 tablet by mouth daily, Disp: 30 tablet, Rfl: 3    midodrine (PROAMATINE) 2.5 MG tablet, Take 1 tablet by mouth 3 times daily (with meals), Disp: 90 tablet, Rfl: 3    rosuvastatin (CRESTOR) 10 MG tablet, Take 10 mg by mouth nightly, Disp: , Rfl:     furosemide (LASIX) 20 MG tablet, Take 20 mg by mouth daily, Disp: , [facility])    Narrative:     [id number]     selected       Radiologic studies results:  CT ABDOMEN PELVIS W IV CONTRAST Additional Contrast? None   Final Result       1. No evidence of an acute process. 2. New dense structure near the terminal ileum. This is presumably something ingested. There is no associated obstruction. **This report has been created using voice recognition software. It may contain minor errors which are inherent in voice recognition technology. **      Final report electronically signed by Dr. Isiah Mcbride on 4/10/2021 9:19 AM          ED Medications administered this visit:   Medications   pantoprazole (PROTONIX) injection 40 mg (40 mg Intravenous Given 4/10/21 0850)     And   sodium chloride (PF) 0.9 % injection 10 mL (10 mLs Intravenous Given 4/10/21 0850)   0.9 % sodium chloride infusion (has no administration in time range)   0.9 % sodium chloride bolus (0 mLs Intravenous Stopped 4/10/21 1007)   ondansetron (ZOFRAN) injection 4 mg (4 mg Intravenous Given 4/10/21 0757)   iopamidol (ISOVUE-370) 76 % injection 80 mL (80 mLs Intravenous Given 4/10/21 0837)   calcium gluconate 10 % injection 1,000 mg (1,000 mg Intravenous Given 4/10/21 1007)   acetaminophen (TYLENOL) tablet 1,000 mg (1,000 mg Oral Given 4/10/21 1007)         ED COURSE     ED Course as of Apr 10 1039   Sat Apr 10, 2021   0746 OCCULT BLOOD FECAL: Positive [AL]   1548 Will give patient a unit of PRBC due to her active hemorrhaging and hypotension   Hemoglobin Quant(!!): 7.0 [AL]   0831 Calcium gluconate was ordered. Magnesium level is also added on. Potassium(!): 5.4 [AL]   0831 Sodium(!): 132 [AL]   0831 ALT(!): 9 [AL]   0831 Glucose(!): 151 [AL]   0831 Troponin T: < 0.010 [AL]   0831 Lipase: 38.2 [AL]   0831 Osmolality Ca.6 [AL]   0847 Lactic Acid, Sepsis: 1.8 [AL]   0930 \"IMPRESSION:     1. No evidence of an acute process. 2. New dense structure near the terminal ileum.  This is presumably something ingested. There is no associated obstruction. \"   CT ABDOMEN PELVIS W IV CONTRAST Additional Contrast? None [AL]   1972 Gastroenterology was contacted for further evaluation patient to the hospitalist will be contacted for admission. Patient's update her laboratories as well as imaging results and the need for admission. She was given a Tylenol for her gradual onset mild headache that is present. [AL]   36 Dr. Alyssa Jimenes from gastroenterology answered and has agreed to consult on patient. The hospitalist will be contacted for admission. [AL]   26 Hospitalist was contacted for admission. [AL]      ED Course User Index  [AL] Saira Weber MD         MEDICATION CHANGES     New Prescriptions    No medications on file         FINAL DISPOSITION     Final diagnoses:   Abdominal pain, unspecified abdominal location   Gastrointestinal hemorrhage, unspecified gastrointestinal hemorrhage type   Iron deficiency anemia, unspecified iron deficiency anemia type   Hyperkalemia     Condition: condition: stable  Dispo: Admit to telemetry      This transcription was electronically signed. Parts of this transcriptions may have been dictated by use of voice recognition software and electronically transcribed, and parts may have been transcribed with the assistance of an ED scribe. The transcription may contain errors not detected in proofreading. Please refer to my supervising physician's documentation if my documentation differs.     Electronically Signed: Saira Weber, 04/10/21, 10:39 AM         Saira Weber MD  Resident  04/10/21 5523 deferred

## 2023-09-01 NOTE — H&P PST ADULT - HISTORY OF PRESENT ILLNESS
47y Female presents today for presurgical testing for OSTEOTOMY 1ST METATARSAL RIGHT FOOT AND ARTHROPLASTY SECOND DIGIT LEFT FOOT. Patient reports well over 5 years of suffering from pain in right foot due to bunion causing her great pain with walking. She reports pain is elicited when wearing closed toe shoes and with ambulation. The pain is localized to her right great toe, is an 8/10 on pain scale, throbbing in nature, intermittently occurring when wearing closed shoes. She offers no other complaints at this time, now for scheduled procedure.   Patient denies any CP, palpitations, SOB, cough, or dysuria. No recent URI or UTI.  Stated exercise tolerance is FOS 2-3  JEISON screen reviewed    Patient denies any recent personal exposure to COVID19. Denies any sick contacts. Patient denies travel within the past 30 days. Patient was instructed to quarantine until after procedure.    Anesthesia Alert  YES--Difficult Airway - Class IV  NO--History of neck surgery or radiation  NO--Limited ROM of neck  NO--History of Malignant hyperthermia  NO--Personal or family history of Pseudocholinesterase deficiency.  NO--Prior Anesthesia Complication  NO--Latex Allergy  NO--Loose teeth  NO--History of Rheumatoid Arthritis  NO--JEISON  NO--Bleeding risk  NO--Other_____    Revised Cardiac Risk Index for Pre-Operative Risk from [employer]  on 9/1/2023  ** All calculations should be rechecked by clinician prior to use **    RESULT SUMMARY:  0 points  Class I Risk    3.9 %  30-day risk of death, MI, or cardiac arrest    From [place] 2017, based on pooled data from 5 high quality external validations (4 prospective). These numbers are higher than those often quoted from the now-outdated original study (Pierre 1999). See Evidence for details.      INPUTS:  Elevated-risk surgery —> 0 = No  History of ischemic heart disease —> 0 = No  History of congestive heart failure —> 0 = No  History of cerebrovascular disease —> 0 = No  Pre-operative treatment with insulin —> 0 = No  Pre-operative creatinine >2 mg/dL / 176.8 µmol/L —> 0 = No    D[facility] Activity Status Index (DASI) from [employer]  on 9/1/2023  ** All calculations should be rechecked by clinician prior to use **    RESULT SUMMARY:  58.2 points  The higher the score (maximum 58.2), the higher the functional status.    9.89 METs        INPUTS:  Take care of self —> 2.75 = Yes  Walk indoors —> 1.75 = Yes  Walk 1&ndash;2 blocks on level ground —> 2.75 = Yes  Climb a flight of stairs or walk up a hill —> 5.5 = Yes  Run a short distance —> 8 = Yes  Do light work around the house —> 2.7 = Yes  Do moderate work around the house —> 3.5 = Yes  Do heavy work around the house —> 8 = Yes  Do yardwork —> 4.5 = Yes  Have sexual relations —> 5.25 = Yes  Participate in moderate recreational activities —> 6 = Yes  Participate in strenuous sports —> 7.5 = Yes    Patient states that this is their complete medical history and list of medications.  Patient verbalized understanding of instructions given during this visit and was given the opportunity to ask questions and have them answered. They were instructed to follow up with their surgeon/surgeon's office with any questions regarding their procedure.

## 2023-09-01 NOTE — H&P PST ADULT - NSICDXFAMILYHX_GEN_ALL_CORE_FT
FAMILY HISTORY:  Father  Still living? No  FH: CHF (congestive heart failure), Age at diagnosis: Age Unknown  FH: COPD (chronic obstructive pulmonary disease), Age at diagnosis: Age Unknown    Mother  Still living? No  FH: lung cancer, Age at diagnosis: Age Unknown

## 2023-09-01 NOTE — H&P PST ADULT - REASON FOR ADMISSION
Case Type: OP Non-block TimeSuite: CASProceduralist: Lucio Baker  Confirmed Surgery DateTime: 09- - 0:00PAST DateTime: 09- - 9:00Procedure: OSTEOTOMY 1ST METATARSAL RIGHT FOOT AND ARTHROPLASTY SECOND DIGIT LEFT FOOT  ERP?: UnavailableLaterality: BilateralLength of Procedure: 60 Minutes  Anesthesia Type: Local Standby

## 2023-09-02 DIAGNOSIS — M20.11 HALLUX VALGUS (ACQUIRED), RIGHT FOOT: ICD-10-CM

## 2023-09-02 DIAGNOSIS — Z01.818 ENCOUNTER FOR OTHER PREPROCEDURAL EXAMINATION: ICD-10-CM

## 2023-09-18 ENCOUNTER — TRANSCRIPTION ENCOUNTER (OUTPATIENT)
Age: 48
End: 2023-09-18

## 2023-09-18 ENCOUNTER — RESULT REVIEW (OUTPATIENT)
Age: 48
End: 2023-09-18

## 2023-09-18 ENCOUNTER — OUTPATIENT (OUTPATIENT)
Dept: OUTPATIENT SERVICES | Facility: HOSPITAL | Age: 48
LOS: 1 days | Discharge: HOME | End: 2023-09-18
Payer: COMMERCIAL

## 2023-09-18 VITALS
SYSTOLIC BLOOD PRESSURE: 130 MMHG | DIASTOLIC BLOOD PRESSURE: 72 MMHG | RESPIRATION RATE: 16 BRPM | HEART RATE: 80 BPM | OXYGEN SATURATION: 100 %

## 2023-09-18 VITALS
WEIGHT: 199.74 LBS | DIASTOLIC BLOOD PRESSURE: 70 MMHG | HEART RATE: 89 BPM | RESPIRATION RATE: 16 BRPM | SYSTOLIC BLOOD PRESSURE: 126 MMHG | OXYGEN SATURATION: 99 % | HEIGHT: 70 IN | TEMPERATURE: 98 F

## 2023-09-18 DIAGNOSIS — M67.472 GANGLION, LEFT ANKLE AND FOOT: ICD-10-CM

## 2023-09-18 DIAGNOSIS — M67.462 GANGLION, LEFT KNEE: ICD-10-CM

## 2023-09-18 DIAGNOSIS — Z98.890 OTHER SPECIFIED POSTPROCEDURAL STATES: Chronic | ICD-10-CM

## 2023-09-18 DIAGNOSIS — M21.611 BUNION OF RIGHT FOOT: ICD-10-CM

## 2023-09-18 DIAGNOSIS — M20.42 OTHER HAMMER TOE(S) (ACQUIRED), LEFT FOOT: ICD-10-CM

## 2023-09-18 DIAGNOSIS — Z98.82 BREAST IMPLANT STATUS: Chronic | ICD-10-CM

## 2023-09-18 DIAGNOSIS — M20.11 HALLUX VALGUS (ACQUIRED), RIGHT FOOT: ICD-10-CM

## 2023-09-18 PROCEDURE — 88304 TISSUE EXAM BY PATHOLOGIST: CPT | Mod: 26

## 2023-09-18 PROCEDURE — C1713: CPT

## 2023-09-18 PROCEDURE — C1769: CPT

## 2023-09-18 PROCEDURE — 88304 TISSUE EXAM BY PATHOLOGIST: CPT

## 2023-09-18 PROCEDURE — 73630 X-RAY EXAM OF FOOT: CPT | Mod: 26,50

## 2023-09-18 PROCEDURE — 88311 DECALCIFY TISSUE: CPT | Mod: 26

## 2023-09-18 PROCEDURE — 73630 X-RAY EXAM OF FOOT: CPT | Mod: LT

## 2023-09-18 PROCEDURE — 88311 DECALCIFY TISSUE: CPT

## 2023-09-18 RX ORDER — KETOROLAC TROMETHAMINE 30 MG/ML
30 SYRINGE (ML) INJECTION ONCE
Refills: 0 | Status: DISCONTINUED | OUTPATIENT
Start: 2023-09-18 | End: 2023-09-18

## 2023-09-18 RX ORDER — SODIUM CHLORIDE 9 MG/ML
1000 INJECTION, SOLUTION INTRAVENOUS
Refills: 0 | Status: DISCONTINUED | OUTPATIENT
Start: 2023-09-18 | End: 2023-09-18

## 2023-09-18 RX ORDER — CYCLOBENZAPRINE HYDROCHLORIDE 10 MG/1
1 TABLET, FILM COATED ORAL
Refills: 0 | DISCHARGE

## 2023-09-18 RX ORDER — ONDANSETRON 8 MG/1
4 TABLET, FILM COATED ORAL ONCE
Refills: 0 | Status: DISCONTINUED | OUTPATIENT
Start: 2023-09-18 | End: 2023-09-18

## 2023-09-18 RX ORDER — OXYCODONE AND ACETAMINOPHEN 5; 325 MG/1; MG/1
1 TABLET ORAL EVERY 4 HOURS
Refills: 0 | Status: DISCONTINUED | OUTPATIENT
Start: 2023-09-18 | End: 2023-09-18

## 2023-09-18 RX ORDER — MORPHINE SULFATE 50 MG/1
2 CAPSULE, EXTENDED RELEASE ORAL
Refills: 0 | Status: DISCONTINUED | OUTPATIENT
Start: 2023-09-18 | End: 2023-09-18

## 2023-09-18 RX ADMIN — MORPHINE SULFATE 2 MILLIGRAM(S): 50 CAPSULE, EXTENDED RELEASE ORAL at 10:01

## 2023-09-18 RX ADMIN — SODIUM CHLORIDE 100 MILLILITER(S): 9 INJECTION, SOLUTION INTRAVENOUS at 09:39

## 2023-09-18 NOTE — ASU DISCHARGE PLAN (ADULT/PEDIATRIC) - ACTIVITY LEVEL
NWB Right, Partial weightbearing with heel touch to Left side when necessary/No exercise/No weight bearing/Elevate extremity/No tub baths

## 2023-09-18 NOTE — BRIEF OPERATIVE NOTE - NSICDXBRIEFPREOP_GEN_ALL_CORE_FT
PRE-OP DIAGNOSIS:  Bunion, right foot 18-Sep-2023 09:49:03  Dianelys Lowry  Ganglion cyst 18-Sep-2023 09:50:04 2nd toe, Left Dianelys Lowry of left foot 18-Sep-2023 09:50:49 2nd digit Dianelys Lowry

## 2023-09-18 NOTE — BRIEF OPERATIVE NOTE - NSICDXBRIEFPOSTOP_GEN_ALL_CORE_FT
POST-OP DIAGNOSIS:  Bunion, right foot 18-Sep-2023 09:50:17  Dianelys Lowry  Ganglion cyst 18-Sep-2023 09:50:31 2nd toe, Left Dianelys Lowry of left foot 18-Sep-2023 09:51:05 2nd digit Dianelys Lowry

## 2023-09-18 NOTE — ASU DISCHARGE PLAN (ADULT/PEDIATRIC) - ASU DC SPECIAL INSTRUCTIONSFT
You had surgery for your Right foot bunion and Left 2nd digit ganglion cyst/hammertoe.   Please keep your dressings dry, clean, & intact.  Rest, elevate, and ice your feet when at rest.  Pain medication was sent to your pharmacy.  Do not place weight on your Right foot. You can put light pressure with heel touch on the Left foot.   Please wear your surgical shoe at all times.   Please follow up with Dr. Baker in clinic in 1 week.

## 2023-09-18 NOTE — ASU DISCHARGE PLAN (ADULT/PEDIATRIC) - NS MD DC FALL RISK RISK
For information on Fall & Injury Prevention, visit: [email or web address] OR  [email or web address] OR  https://www.cdc.gov/steadi/patient.html

## 2023-09-18 NOTE — ASU DISCHARGE PLAN (ADULT/PEDIATRIC) - PROCEDURE
Bunionectomy Right foot, Left 2nd digit hammertoe correction, excision of Left 2nd digit ganglion cyst

## 2023-09-18 NOTE — BRIEF OPERATIVE NOTE - NSICDXBRIEFPROCEDURE_GEN_ALL_CORE_FT
PROCEDURES:  Arthroplasty, IP joint 18-Sep-2023 09:48:46 Vikash AP at DIPJ 2nd toe, Left Dianelys Lowry  Bunionectomy, right 18-Sep-2023 09:49:25  Dianelys Lowry  Excision of ganglion cyst of toe of left foot 18-Sep-2023 09:49:42 2nd toe Dianelys Lowry

## 2023-09-18 NOTE — PRE-ANESTHESIA EVALUATION ADULT - SPO2 (%)
99
Additional Notes: Samples of Cerave SA cream and coupon for Eucerin roughness relief lotion.
Render Risk Assessment In Note?: no
Detail Level: Detailed
Additional Notes: If recurs, advised to cover with a bandaid/not pop or extract and contact office for re-eval/tx.

## 2023-09-18 NOTE — ASU DISCHARGE PLAN (ADULT/PEDIATRIC) - CARE PROVIDER_API CALL
Lucio Baker  Podiatric Medicine and Surgery  [address] [place] [address]  Phone: [phone]  Fax: [phone]  Established Patient  Follow Up Time: 1 week

## 2023-09-25 LAB — SURGICAL PATHOLOGY STUDY: SIGNIFICANT CHANGE UP

## 2024-01-19 RX ORDER — NORETHINDRONE ACETATE AND ETHINYL ESTRADIOL, ETHINYL ESTRADIOL AND FERROUS FUMARATE 1MG-10(24)
1 MG-10 MCG / KIT ORAL DAILY
Qty: 3 | Refills: 1 | Status: ACTIVE | COMMUNITY
Start: 2024-01-19 | End: 2024-07-17

## 2024-01-23 ENCOUNTER — APPOINTMENT (OUTPATIENT)
Dept: OBGYN | Facility: CLINIC | Age: 49
End: 2024-01-23
Payer: COMMERCIAL

## 2024-01-23 VITALS
SYSTOLIC BLOOD PRESSURE: 116 MMHG | DIASTOLIC BLOOD PRESSURE: 74 MMHG | WEIGHT: 203 LBS | HEIGHT: 69 IN | BODY MASS INDEX: 30.07 KG/M2

## 2024-01-23 DIAGNOSIS — R35.0 FREQUENCY OF MICTURITION: ICD-10-CM

## 2024-01-23 DIAGNOSIS — Z01.411 ENCOUNTER FOR GYNECOLOGICAL EXAMINATION (GENERAL) (ROUTINE) WITH ABNORMAL FINDINGS: ICD-10-CM

## 2024-01-23 DIAGNOSIS — N39.0 URINARY TRACT INFECTION, SITE NOT SPECIFIED: ICD-10-CM

## 2024-01-23 DIAGNOSIS — R82.998 OTHER ABNORMAL FINDINGS IN URINE: ICD-10-CM

## 2024-01-23 DIAGNOSIS — R33.9 RETENTION OF URINE, UNSPECIFIED: ICD-10-CM

## 2024-01-23 LAB
BILIRUB UR QL STRIP: NORMAL
CLARITY UR: CLEAR
COLLECTION METHOD: NORMAL
GLUCOSE UR-MCNC: NORMAL
HCG UR QL: 1 EU/DL
HGB UR QL STRIP.AUTO: NORMAL
KETONES UR-MCNC: NORMAL
LEUKOCYTE ESTERASE UR QL STRIP: NORMAL
NITRITE UR QL STRIP: NORMAL
PH UR STRIP: 7
PROT UR STRIP-MCNC: NORMAL
SP GR UR STRIP: 1.02

## 2024-01-23 PROCEDURE — 99213 OFFICE O/P EST LOW 20 MIN: CPT | Mod: 25

## 2024-01-23 PROCEDURE — 81002 URINALYSIS NONAUTO W/O SCOPE: CPT

## 2024-01-23 PROCEDURE — 99396 PREV VISIT EST AGE 40-64: CPT

## 2024-01-23 RX ORDER — NORETHINDRONE ACETATE AND ETHINYL ESTRADIOL, ETHINYL ESTRADIOL AND FERROUS FUMARATE 1MG-10(24)
1 MG-10 MCG / KIT ORAL DAILY
Qty: 3 | Refills: 1 | Status: ACTIVE | COMMUNITY
Start: 2024-01-23 | End: 2024-07-21

## 2024-01-23 RX ORDER — NITROFURANTOIN (MONOHYDRATE/MACROCRYSTALS) 25; 75 MG/1; MG/1
100 CAPSULE ORAL
Qty: 14 | Refills: 0 | Status: ACTIVE | COMMUNITY
Start: 2024-01-23 | End: 1900-01-01

## 2024-01-23 NOTE — HISTORY OF PRESENT ILLNESS
[FreeTextEntry1] : Patient is 48 years old para 3-0-0-3 last menstrual period November 2019. She is presently on oral contraceptives in the form of lo Loestrin FE Patient notes urinary symptoms including frequency of urination, feeling of incomplete emptying of the bladder. Urine dip notes moderate leukocytes Urine culture sent She request to continue oral contraceptives in the form of lo Loestrin FE

## 2024-01-23 NOTE — PHYSICAL EXAM
[Chaperone Present] : A chaperone was present in the examining room during all aspects of the physical examination [FreeTextEntry1] : Carlotta [Appropriately responsive] : appropriately responsive [Alert] : alert [No Acute Distress] : no acute distress [No Lymphadenopathy] : no lymphadenopathy [Regular Rate Rhythm] : regular rate rhythm [No Murmurs] : no murmurs [Clear to Auscultation B/L] : clear to auscultation bilaterally [Soft] : soft [Non-tender] : non-tender [Non-distended] : non-distended [No HSM] : No HSM [No Lesions] : no lesions [No Mass] : no mass [Oriented x3] : oriented x3 [Examination Of The Breasts] : a normal appearance [] : implants [No Masses] : no breast masses were palpable [Labia Majora] : normal [Labia Minora] : normal [Normal] : normal [Uterine Adnexae] : normal

## 2024-01-23 NOTE — DISCUSSION/SUMMARY
[FreeTextEntry1] : Pap done Self breast exam stressed Prescribed Macrobid as directed Prescribed lo Loestrin as directed Prescribed hormone profile Urine culture sent Follow-up 6 months or as needed

## 2024-05-30 NOTE — DISCHARGE NOTE OB - AVOID DOUCHING OR TAMPONS UNTIL YOUR POSTPARTUM VISIT
Patient has multiple concerns and updates.     1-The Phentermine is going well and her ankle is doing better.  2-The Citalopram is not helping quite yet.  She has actually been worse with more anxiety and depression.  She is concerned this dose may not be enough but is aware it can sometimes take up to 6 weeks for response.  3- Her father had abnormal factor v leiden testing.  She is requesting this testing with her next vitamin D level which looks like is due now.    Please advise on above.   Statement Selected

## 2024-07-19 NOTE — DISCUSSION/SUMMARY
Detail Level: Detailed
Quality 226: Preventive Care And Screening: Tobacco Use: Screening And Cessation Intervention: Patient screened for tobacco use and is an ex/non-smoker
[de-identified] : \par \par \par \par 
[de-identified] : \par \par \par \par 
[de-identified] : \par \par \par \par

## 2025-07-31 ENCOUNTER — APPOINTMENT (OUTPATIENT)
Dept: ORTHOPEDIC SURGERY | Facility: CLINIC | Age: 50
End: 2025-07-31
Payer: OTHER MISCELLANEOUS

## 2025-07-31 ENCOUNTER — RESULT CHARGE (OUTPATIENT)
Age: 50
End: 2025-07-31

## 2025-07-31 DIAGNOSIS — S69.92XA UNSPECIFIED INJURY OF LEFT WRIST, HAND AND FINGER(S), INITIAL ENCOUNTER: ICD-10-CM

## 2025-07-31 DIAGNOSIS — S93.491A SPRAIN OF OTHER LIGAMENT OF RIGHT ANKLE, INITIAL ENCOUNTER: ICD-10-CM

## 2025-07-31 PROCEDURE — 73610 X-RAY EXAM OF ANKLE: CPT | Mod: LT

## 2025-07-31 PROCEDURE — 73110 X-RAY EXAM OF WRIST: CPT | Mod: LT

## 2025-07-31 PROCEDURE — 99203 OFFICE O/P NEW LOW 30 MIN: CPT | Mod: ACP

## 2025-07-31 RX ORDER — NABUMETONE 750 MG/1
750 TABLET, FILM COATED ORAL
Qty: 60 | Refills: 1 | Status: ACTIVE | COMMUNITY
Start: 2025-07-31 | End: 1900-01-01

## 2025-09-08 ENCOUNTER — APPOINTMENT (OUTPATIENT)
Dept: ORTHOPEDIC SURGERY | Facility: CLINIC | Age: 50
End: 2025-09-08